# Patient Record
Sex: MALE | Race: WHITE
[De-identification: names, ages, dates, MRNs, and addresses within clinical notes are randomized per-mention and may not be internally consistent; named-entity substitution may affect disease eponyms.]

---

## 2017-01-24 NOTE — DIAGNOSTIC IMAGING REPORT
CT SCAN OF THE ABDOMEN AND PELVIS WITHOUT IV CONTRAST



CLINICAL HISTORY:   Left flank pain.



COMPARISON STUDY:  Abdominal CT dated 9/20/2011.



TECHNIQUE: CT scan of the abdomen and pelvis is performed from the lung bases to

the proximal femora. Images are reviewed in the axial, sagittal, and coronal

planes. IV contrast was not administered for this examination. Automated dose

control exposure was utilized.



CT DOSE: 1090.16 mGycm



FINDINGS:



Lung bases: The heart is enlarged and without pericardial effusion. The coronary

arteries are densely calcified. There is a 4 mm pleural-based nodule at the left

lung base. This is unchanged from 2011 and of doubtful significance. No airspace

consolidation or pleural effusion is identified. There is dependent atelectasis.

There is a small hiatal hernia.



Liver: The unenhanced liver is normal in size, contour, and attenuation. There

is no intrahepatic biliary ductal dilatation.



Gallbladder: Unremarkable.



Spleen: Normal in size and attenuation.



Pancreas: Unremarkable.



Adrenal glands: Unremarkable.



Kidneys: The unenhanced kidneys are normal in size and without hydronephrosis.

There are at least 3 nonobstructing left renal calculi and at least 5

nonobstructing right renal calculi measuring up to 3 mm. There is no evidence of

contour deforming renal mass lesion.



Abdominal vasculature: The abdominal aorta is normal in course and caliber

noting mild atherosclerotic calcification.



Bowel: The small bowel and colon are normal in course and caliber. There is mild

sigmoid diverticulosis without CT evidence of acute diverticulitis. The appendix

is  not clearly identified.



Peritoneum: There is no intraperitoneal free air or abdominal ascites.



Lymphadenopathy: None.



Pelvic viscera: The bladder, prostate, and seminal vesicles are normal as

visualized.. Surgical clips are noted along the spermatic cord.



Skeletal structures: No lytic or blastic lesions are seen. There is

mild/moderate lumbar sacral spondylosis.





IMPRESSION:



1. There are no acute infectious or inflammatory findings in the abdomen or

pelvis.



2. Bilateral nonobstructing renal calculi are identified. There is no

obstructing renal calculus or hydronephrosis seen.



3. Cardiomegaly.



4. Colonic diverticulosis without CT evidence of acute diverticulitis.



5. Additional findings as above.







Electronically signed by:  Michael Purvis M.D.

1/24/2017 4:08 PM



Dictated Date/Time:  1/24/2017 4:00 PM

## 2017-01-24 NOTE — EMERGENCY ROOM VISIT NOTE
History


First contact with patient:  15:05


Chief Complaint:  ABDOMINAL PAIN


Stated Complaint:  KIDNEY STONE


Nursing Triage Summary:  


pt reports abdominal pain LLflank X 3 days , denies NV , or urinary sx





History of Present Illness


The patient is a 56 year old male who presents to the Emergency Room with 

complaints of left flank pain intermittently over the past 3 days.  The patient 

denies any urinary symptoms of frequency, urgency, dysuria or hematuria.  The 

patient admits to slight nausea but denies any vomiting.  The patient does 

admit to having loose bowel movements 3-4 day for the past several days.  The 

patient does admit that he has IBS it is under a lot of stress lately.  This is 

not that abnormal for the patient.  He has had colonoscopies in the past 

without any evidence of diverticulosis.  The patient denies any fever.





Review of Systems


10 system review was performed and was negative unless stated otherwise history 

of present illness.





Past Medical/Surgical History


Medical Problems:


(1) Esophageal dysmotility


(2) Gastroesophageal reflux disease


(3) Kidney stone








Social History


Smoking Status:  Former Smoker


Alcohol Use:  occasionally


Marital Status:  


Occupation Status:  employed





Current/Historical Medications


Scheduled


Aspirin (Aspirin Ec), 81 MG PO QPM


Atorvastatin (Atorvastatin Calcium), 20 MG PO HS


Cholecalciferol (Vitamin D), 2,000 INTER.UNIT PO QAM


Fiber Laxative (Fiber Laxative), 1 TAB PO DAILY


Pantoprazole (Pantoprazole Sodium), 40 MG PO BID


Sertraline HCl (Sertraline HCl), 50 MG PO DAILY





Scheduled PRN


Lorazepam (Lorazepam), 0.5 MG PO DAILY PRN for Anxiety





Allergies


Coded Allergies:  


     Sulfa Antibiotics (Verified  Allergy, Severe, SEVERE HIVES/ITCHING, 1/24/17

)





Physical Exam


Vital Signs











  Date Time  Temp Pulse Resp B/P Pulse Ox O2 Delivery O2 Flow Rate FiO2


 


1/24/17 15:00 36.7 67 18 178/106 98 Room Air  











Physical Exam


GENERAL: 56-year-old white male appears in no acute distress.


MENTAL Status: Alert and oriented 3.


EYES: No icterus noted


MOUTH: Mucosa is moist


NECK: Supple, no lymphadenopathy noted.  No carotid bruits noted.


LUNGS: Clear auscultation without wheezes rales or rhonchi.


CARDIAC: Regular rate and rhythm without murmur.  Pulses is full and equal 

throughout.


BACK: No CVA tenderness noted.


ABDOMEN: Positive bowel sounds all 4 quadrants.  Soft, nontender to palpation 

without organomegaly or masses.


EXTREMITIES: No cyanosis or edema noted.





Medical Decision & Procedures


ER Provider


Diagnostic Interpretation:


CT SCAN OF THE ABDOMEN AND PELVIS WITHOUT IV CONTRAST





CLINICAL HISTORY:   Left flank pain.





COMPARISON STUDY:  Abdominal CT dated 9/20/2011.





TECHNIQUE: CT scan of the abdomen and pelvis is performed from the lung bases to


the proximal femora. Images are reviewed in the axial, sagittal, and coronal


planes. IV contrast was not administered for this examination. Automated dose


control exposure was utilized.





CT DOSE: 1090.16 mGycm





FINDINGS:





Lung bases: The heart is enlarged and without pericardial effusion. The coronary


arteries are densely calcified. There is a 4 mm pleural-based nodule at the left


lung base. This is unchanged from 2011 and of doubtful significance. No airspace


consolidation or pleural effusion is identified. There is dependent atelectasis.


There is a small hiatal hernia.





Liver: The unenhanced liver is normal in size, contour, and attenuation. There


is no intrahepatic biliary ductal dilatation.





Gallbladder: Unremarkable.





Spleen: Normal in size and attenuation.





Pancreas: Unremarkable.





Adrenal glands: Unremarkable.





Kidneys: The unenhanced kidneys are normal in size and without hydronephrosis.


There are at least 3 nonobstructing left renal calculi and at least 5


nonobstructing right renal calculi measuring up to 3 mm. There is no evidence of


contour deforming renal mass lesion.





Abdominal vasculature: The abdominal aorta is normal in course and caliber


noting mild atherosclerotic calcification.





Bowel: The small bowel and colon are normal in course and caliber. There is mild


sigmoid diverticulosis without CT evidence of acute diverticulitis. The appendix


is  not clearly identified.





Peritoneum: There is no intraperitoneal free air or abdominal ascites.





Lymphadenopathy: None.





Pelvic viscera: The bladder, prostate, and seminal vesicles are normal as


visualized.. Surgical clips are noted along the spermatic cord.





Skeletal structures: No lytic or blastic lesions are seen. There is


mild/moderate lumbar sacral spondylosis.








IMPRESSION:





1. There are no acute infectious or inflammatory findings in the abdomen or


pelvis.





2. Bilateral nonobstructing renal calculi are identified. There is no


obstructing renal calculus or hydronephrosis seen.





3. Cardiomegaly.





4. Colonic diverticulosis without CT evidence of acute diverticulitis.





5. Additional findings as above.











Electronically signed by:  Michael Purvis M.D.


1/24/2017 4:08 PM





Laboratory Results


1/24/17 15:00








Red Blood Count 4.58, Mean Corpuscular Volume 91.9, Mean Corpuscular Hemoglobin 

32.1, Mean Corpuscular Hemoglobin Concent 34.9, Mean Platelet Volume 10.1, 

Neutrophils (%) (Auto) 58.2, Lymphocytes (%) (Auto) 29.2, Monocytes (%) (Auto) 

10.6, Eosinophils (%) (Auto) 1.6, Basophils (%) (Auto) 0.2, Neutrophils # (Auto

) 3.37, Lymphocytes # (Auto) 1.69, Monocytes # (Auto) 0.61, Eosinophils # (Auto

) 0.09, Basophils # (Auto) 0.01





1/24/17 15:00

















Test


  1/24/17


15:00 1/24/17


15:10


 


White Blood Count


  5.78 K/uL


(4.8-10.8) 


 


 


Red Blood Count


  4.58 M/uL


(4.7-6.1) 


 


 


Hemoglobin


  14.7 g/dL


(14.0-18.0) 


 


 


Hematocrit 42.1 % (42-52)  


 


Mean Corpuscular Volume


  91.9 fL


() 


 


 


Mean Corpuscular Hemoglobin


  32.1 pg


(25-34) 


 


 


Mean Corpuscular Hemoglobin


Concent 34.9 g/dl


(32-36) 


 


 


Platelet Count


  222 K/uL


(130-400) 


 


 


Mean Platelet Volume


  10.1 fL


(7.4-10.4) 


 


 


Neutrophils (%) (Auto) 58.2 %  


 


Lymphocytes (%) (Auto) 29.2 %  


 


Monocytes (%) (Auto) 10.6 %  


 


Eosinophils (%) (Auto) 1.6 %  


 


Basophils (%) (Auto) 0.2 %  


 


Neutrophils # (Auto)


  3.37 K/uL


(1.4-6.5) 


 


 


Lymphocytes # (Auto)


  1.69 K/uL


(1.2-3.4) 


 


 


Monocytes # (Auto)


  0.61 K/uL


(0.11-0.59) 


 


 


Eosinophils # (Auto)


  0.09 K/uL


(0-0.5) 


 


 


Basophils # (Auto)


  0.01 K/uL


(0-0.2) 


 


 


RDW Standard Deviation


  42.0 fL


(36.4-46.3) 


 


 


RDW Coefficient of Variation


  12.5 %


(11.5-14.5) 


 


 


Immature Granulocyte % (Auto) 0.2 %  


 


Immature Granulocyte # (Auto)


  0.01 K/uL


(0.00-0.02) 


 


 


Anion Gap


  8.0 mmol/L


(3-11) 


 


 


Est Creatinine Clear Calc


Drug Dose 75.8 ml/min 


  


 


 


Estimated GFR (


American) 76.8 


  


 


 


Estimated GFR (Non-


American 66.3 


  


 


 


BUN/Creatinine Ratio 23.6 (10-20)  


 


Calcium Level


  9.1 mg/dl


(8.5-10.1) 


 


 


Total Bilirubin


  0.3 mg/dl


(0.2-1) 


 


 


Direct Bilirubin


  < 0.1 mg/dl


(0-0.2) 


 


 


Aspartate Amino Transf


(AST/SGOT) 22 U/L (15-37) 


  


 


 


Alanine Aminotransferase


(ALT/SGPT) 30 U/L (12-78) 


  


 


 


Alkaline Phosphatase


  69 U/L


() 


 


 


Total Protein


  7.7 gm/dl


(6.4-8.2) 


 


 


Albumin


  4.2 gm/dl


(3.4-5.0) 


 


 


Lipase


  284 U/L


() 


 


 


Urine Color  YELLOW 


 


Urine Appearance  CLEAR (CLEAR) 


 


Urine pH  7.5 (4.5-7.5) 


 


Urine Specific Gravity


  


  1.017


(1.000-1.030)


 


Urine Protein  NEG (NEG) 


 


Urine Glucose (UA)  NEG (NEG) 


 


Urine Ketones  NEG (NEG) 


 


Urine Occult Blood  NEG (NEG) 


 


Urine Nitrite  NEG (NEG) 


 


Urine Bilirubin  NEG (NEG) 


 


Urine Urobilinogen  NEG (NEG) 


 


Urine Leukocyte Esterase  TRACE (NEG) 


 


Urine WBC (Auto)  1-5 /hpf (0-5) 


 


Urine RBC (Auto)  0-4 /hpf (0-4) 


 


Urine Hyaline Casts (Auto)  0 /lpf (0-5) 


 


Urine Epithelial Cells (Auto)  0-5 /lpf (0-5) 


 


Urine Bacteria (Auto)  NEG (NEG) 











Medications Administered











 Medications


  (Trade)  Dose


 Ordered  Sig/Romario


 Route  Start Time


 Stop Time Status Last Admin


Dose Admin


 


 Sodium Chloride


  (Nss 1000ml)  1,000 ml @ 


 999 mls/hr  Q1H1M STAT


 IV  1/24/17 15:25


 1/24/17 16:25 DC 1/24/17 15:43


999 MLS/HR


 


 Ketorolac


 Tromethamine


  (Toradol Inj)  30 mg  NOW  STAT


 IV  1/24/17 15:25


 1/24/17 15:27 DC 1/24/17 15:43


30 MG


 


 Morphine Sulfate


  (MoRPHine


 SULFATE INJ)  6 mg  NOW  STAT


 IV  1/24/17 15:25


 1/24/17 15:27 DC 1/24/17 15:43


6 MG


 


 Ondansetron HCl


  (Zofran Inj)  4 mg  NOW  STAT


 IV  1/24/17 15:25


 1/24/17 15:27 DC 1/24/17 15:42


4 MG


 


 Tamsulosin HCl


  (Flomax Cap)  0.4 mg  NOW  ONCE


 PO  1/24/17 15:30


 1/24/17 15:31 DC 1/24/17 15:43


0.4 MG











ED Course


The patient was evaluated.  IV access was obtained.  The patient was given 1 L 

normal saline wide-open.  CBC and differential, renal profile, LFTs and lipase 

levels was ordered.  Urinalysis was ordered.  The patient was given Toradol 30 

mg IV, morphine 6 mg IV, Zofran 4 mg IV and Flomax 0.4 mg by mouth.  The patient

's labs are reviewed.  The patient's BUN was slightly elevated otherwise labs 

are unremarkable.  Urinalysis was negative.  CT stone study was ordered and 

interpreted by the radiologist as above without any evidence of ureteral 

calculi or hydronephrosis.  There were multiple renal calculi bilaterally.  

There is diverticulosis without any evidence of diverticulitis.  The patient 

was informed of the findings.  The patient was discharged home in stable 

condition.





Medical Decision


Differential diagnosis include UTI, pyelonephritis, ureteral calculi, 

diverticulitis, colitis, IBS





Impression





 Primary Impression:  


 IBS (irritable bowel syndrome)


 Additional Impression:  


 Dehydration





Departure Information


Dispostion


Home / Self-Care





Condition


GOOD





Prescriptions





Dicyclomine Hcl (BENTYL) 20 Mg Tab


20 MG PO Q8 for Diarrhea, #20 TAB


   Prov: April Ortega PA-C         1/24/17





Referrals


LEILA De La Rosa MD (PCP)





Forms


Call Back Authorization, HOME CARE DOCUMENTATION FORM,                         

                                       IMPORTANT VISIT INFORMATION





Patient Instructions


Dehydration, My [facility]





Additional Instructions





Push fluids.  Take Bentyl as needed for cramping and diarrhea.  If symptoms 

persist or worsen, follow-up with your family doctor.





Problem Qualifiers








 Primary Impression:  


 IBS (irritable bowel syndrome)


 Irritable bowel syndrome type:  with diarrhea  Qualified Codes:  K58.0 - 

Irritable bowel syndrome with diarrhea

## 2017-03-28 NOTE — DIAGNOSTIC IMAGING REPORT
UPPER GI SERIES AND SMALL BOWEL FOLLOW-THROUGH



CLINICAL HISTORY: Abdominal pain. Esophagitis. Hiatal hernia.   



COMPARISON STUDY:  Upper GI series and small bowel follow-through October 13, 2011.



FLUOROSCOPY TIME: 3.7 minutes.



FINDINGS: 35 fluoroscopic images were obtained. There was mild to moderate

esophageal dysmotility. No esophageal stricture was identified. There was mild

mucosal irregularity of the mid to distal esophagus. This may reflect

esophagitis. No hiatal hernia was identified. No reflux was elicited. Mild

gastric fold thickening was noted. Duodenum was normal. Transit time to the

cecum was normal at 50 minutes. The terminal ileum was not well visualized on

this exam. Jejunal and ileal fold patterns were normal.



IMPRESSION:  



1. Gastric fold thickening. While nonspecific, this may reflect gastritis.



2. Mild mucosal irregularity the mid to distal esophagus which may reflect

esophagitis. No esophageal stricture.



3. No evidence of small bowel obstruction. Normal small bowel fold pattern.



4. Mild to moderate esophageal dysmotility.







Electronically signed by:  Michi Abernathy M.D.

3/28/2017 8:48 AM



Dictated Date/Time:  3/28/2017 8:45 AM

## 2017-08-20 NOTE — DIAGNOSTIC IMAGING REPORT
CT SCAN OF THE ABDOMEN AND PELVIS WITHOUT IV CONTRAST



CLINICAL HISTORY:   Bloating. Generalized abdominal pain.



COMPARISON STUDY:  Abdominal CT dated 1/24/2017 and 9/20/2011.



TECHNIQUE: CT scan of the abdomen and pelvis is performed from the lung bases to

the proximal femora. Images are reviewed in the axial, sagittal, and coronal

planes. IV contrast was not administered for this examination as per the

referring clinician. Note that the examination was performed in suboptimal

fashion without oral and IV contrast. Automated dose control exposure was

utilized.



CT DOSE: 475.87 mGy.cm



FINDINGS:



Lung bases: The heart is top normal in size and without pericardial effusion.

There are coronary artery calcifications. There is a 4 mm pleural-based nodule

at the left lung base on image #14. This is unchanged from 2011 and of doubtful

significance. No airspace consolidation or pleural effusion is identified. There

is dependent atelectasis. There is a small hiatal hernia.



Liver: The unenhanced liver is normal in size, contour, and attenuation. There

is no intrahepatic biliary ductal dilatation.



Gallbladder: Unremarkable.



Spleen: Normal in size and attenuation.



Pancreas: Unremarkable.



Adrenal glands: Unremarkable.



Kidneys: The unenhanced kidneys are normal in size and without hydronephrosis.

There are at least 2 nonobstructing left renal calculi and at least 4

nonobstructing right renal calculi measuring up to 3 mm. There is no evidence of

contour deforming renal mass lesion.



Abdominal vasculature: The abdominal aorta is normal in course and caliber

noting mild to moderate atherosclerotic calcification.



Bowel: The small bowel and colon are normal in course and caliber. There is mild

sigmoid diverticulosis without CT evidence of acute diverticulitis. The appendix

is  not identified and reported surgically absent.



Peritoneum: There is no intraperitoneal free air or abdominal ascites.



Lymphadenopathy: None.



Pelvic viscera: The bladder, prostate, and seminal vesicles are normal as

visualized.. Surgical clips are noted along the spermatic cord.



Skeletal structures: No lytic or blastic lesions are seen. There is mild to

moderate lumbosacral spondylosis.





IMPRESSION:



1. Suboptimal examination without oral and IV contrast.



2. There are no acute infectious or inflammatory findings in the abdomen or

pelvis.



3. There are small bilateral nonobstructing renal calculi.



4. Colonic diverticulosis without CT evidence of acute diverticulitis.



5. Additional findings as above.







Electronically signed by:  Michael Purvis M.D.

8/20/2017 12:52 PM



Dictated Date/Time:  8/20/2017 12:43 PM

## 2017-08-20 NOTE — EMERGENCY ROOM VISIT NOTE
History


Report prepared by Alfredo:  Marla Haddad


Under the Supervision of:  JOSE PickeringO.


First contact with patient:  12:04


Chief Complaint:  ABDOMINAL PAIN


Stated Complaint:  BLOATING PAIN IN BELLY, NO BM'S


Nursing Triage Summary:  


pt to the ED with c/o abd pain that is all over with chills and last BM was 


yesterday





pt is concerned bc he feels like he did before with a previous bowel obstruction





History of Present Illness


The patient is a 56 year old male who presents to the Emergency Room with 

complaints of constant abdominal pain beginning yesterday. The patient states 

that he has a history of bowel obstruction and has been hospitalized 2 times 

before. He reports that he has been having similar symptoms to his previous 

obstruction and has had body aches and chills. He denies any rectal bleeding, 

vomiting, and constipation. The patient states that he had a restless night 

last night and has been having dry heaves that are not unusual for him due to 

his history of esophageal varices. He complains of a productive cough and notes 

that he had 2 bowel movements yesterday and none today. He reports a history of 

appendicitis and hernia repair.





   Source of History:  patient


   Onset:  last night


   Position:  abdomen


   Timing:  constant


   Associated Symptoms:  + chills, No vomiting


Note:


Pt complains of aches. He denies any constipation and rectal pain.





Review of Systems


See HPI for pertinent positives & negatives. A total of 10 systems reviewed and 

were otherwise negative.





Past Medical & Surgical


Medical Problems:


(1) Esophageal dysmotility


(2) Gastroesophageal reflux disease


(3) Kidney stone








Family History


No pertinent family history stated.





Social History


Smoking Status:  Former Smoker


Alcohol Use:  occasionally


Marital Status:  


Occupation Status:  employed





Current/Historical Medications


Scheduled


Aspirin (Aspirin Ec), 81 MG PO QPM


Atorvastatin (Atorvastatin Calcium), 20 MG PO HS


Cholecalciferol (Vitamin D), 2,000 INTER.UNIT PO QAM


Fiber Laxative (Fiber Laxative), 1 TAB PO DAILY


Ondasetron Odt (Zofran Odt), 4 MG SL Q6H


Pantoprazole (Pantoprazole Sodium), 40 MG PO BID


Polyethylene Glycol 3350 (Miralax), 17 GM PO DAILY


Sertraline HCl (Sertraline HCl), 50 MG PO DAILY





Scheduled PRN


Lorazepam (Lorazepam), 0.5 MG PO DAILY PRN for Anxiety


Oxycodone Immediate Rel Tab (Roxicodone Ir), 1-2 TAB PO Q4H PRN for Severe Pain





Allergies


Coded Allergies:  


     Sulfa Antibiotics (Verified  Allergy, Severe, SEVERE HIVES/ITCHING, 1/24/17

)





Physical Exam


Vital Signs











  Date Time  Temp Pulse Resp B/P (MAP) Pulse Ox O2 Delivery O2 Flow Rate FiO2


 


8/20/17 13:50  84 18 153/98 97   


 


8/20/17 12:29  84      


 


8/20/17 11:46 36.9 99 18 171/102 98   











Physical Exam


GENERAL:  Patient is awake, alert, and in no acute distress. Patient is resting 

comfortably and showing no signs of anxiety


EYES: The conjunctivae are clear.  The pupils are round and reactive. 


EARS, NOSE, MOUTH AND THROAT: The nose is without any evidence of any 

deformity. Mucous membranes are moist tongue is midline 


NECK: The neck is nontender and supple.


RESPIRATORY: Lung sounds diminished throughout, scattered rhonchi, no tachypnea 

or conversational dyspnea.


CARDIOVASCULAR:  Regular rate and rhythm noted there no murmurs rubs or gallops 

normal S1 normal S2 


GASTROINTESTINAL: The abdomen is moderately distended but soft. Bowel sounds 

are present in all quadrants. Abdomen is nontender


PELVIS:  The Pelvis is stable.  No tenderness to palpation is noted.  


BACK:  No midline tenderness or or step-off noted range of motion in flexion 

extension as well as rotation no signs of muscle spasm noted


MUSCULOSKELETAL/EXTREMITIES: There is no evidence of gross deformity full range 

of motion is noted in the hips and shoulders


SKIN: There is no obvious evidence of any rash. There are no petechiae, pallor 

or cyanosis noted. 


NEUROLOGIC:  Patient is awake alert and oriented x3





Medical Decision & Procedures


ER Provider


Diagnostic Interpretation:


Radiology results as stated below per my review and radiologist interpretation:





SINGLE VIEW CHEST





FINDINGS: An AP, portable, upright chest radiograph is compared to study dated


6/9/2015 and correlated with chest CT dated 1/5/2016. The examination is


degraded by portable technique and patient rotation.  The cardiomediastinal


silhouette is unremarkable. The lungs and pleural spaces are clear. No


pneumothorax is seen. The bony thorax is grossly intact.





IMPRESSION: No active disease in the chest.





Electronically signed by:  Michael Purvis M.D.


8/20/2017 12:57 PM





Dictated Date/Time:  8/20/2017 12:57 PM





CT SCAN OF THE ABDOMEN AND PELVIS WITHOUT IV CONTRAST





FINDINGS:





Lung bases: The heart is top normal in size and without pericardial effusion.


There are coronary artery calcifications. There is a 4 mm pleural-based nodule


at the left lung base on image #14. This is unchanged from 2011 and of doubtful


significance. No airspace consolidation or pleural effusion is identified. There


is dependent atelectasis. There is a small hiatal hernia.





Liver: The unenhanced liver is normal in size, contour, and attenuation. There


is no intrahepatic biliary ductal dilatation.





Gallbladder: Unremarkable.





Spleen: Normal in size and attenuation.





Pancreas: Unremarkable.





Adrenal glands: Unremarkable.





Kidneys: The unenhanced kidneys are normal in size and without hydronephrosis.


There are at least 2 nonobstructing left renal calculi and at least 4


nonobstructing right renal calculi measuring up to 3 mm. There is no evidence of


contour deforming renal mass lesion.





Abdominal vasculature: The abdominal aorta is normal in course and caliber


noting mild to moderate atherosclerotic calcification.





Bowel: The small bowel and colon are normal in course and caliber. There is mild


sigmoid diverticulosis without CT evidence of acute diverticulitis. The appendix


is  not identified and reported surgically absent.





Peritoneum: There is no intraperitoneal free air or abdominal ascites.





Lymphadenopathy: None.





Pelvic viscera: The bladder, prostate, and seminal vesicles are normal as


visualized.. Surgical clips are noted along the spermatic cord.





Skeletal structures: No lytic or blastic lesions are seen. There is mild to


moderate lumbosacral spondylosis.








IMPRESSION:





1. Suboptimal examination without oral and IV contrast.





2. There are no acute infectious or inflammatory findings in the abdomen or


pelvis.





3. There are small bilateral nonobstructing renal calculi.





4. Colonic diverticulosis without CT evidence of acute diverticulitis.





5. Additional findings as above.





Electronically signed by:  Michael Purvis M.D.


8/20/2017 12:52 PM





Dictated Date/Time:  8/20/2017 12:43 PM





Laboratory Results


8/20/17 12:21








Red Blood Count 4.38, Mean Corpuscular Volume 93.8, Mean Corpuscular Hemoglobin 

33.1, Mean Corpuscular Hemoglobin Concent 35.3, Mean Platelet Volume 10.1, 

Neutrophils (%) (Auto) 78.1, Lymphocytes (%) (Auto) 10.2, Monocytes (%) (Auto) 

11.0, Eosinophils (%) (Auto) 0.2, Basophils (%) (Auto) 0.3, Neutrophils # (Auto

) 4.60, Lymphocytes # (Auto) 0.60, Monocytes # (Auto) 0.65, Eosinophils # (Auto

) 0.01, Basophils # (Auto) 0.02





8/20/17 12:21

















Test


  8/20/17


12:21 8/20/17


13:05


 


White Blood Count


  5.89 K/uL


(4.8-10.8) 


 


 


Red Blood Count


  4.38 M/uL


(4.7-6.1) 


 


 


Hemoglobin


  14.5 g/dL


(14.0-18.0) 


 


 


Hematocrit 41.1 % (42-52)  


 


Mean Corpuscular Volume


  93.8 fL


() 


 


 


Mean Corpuscular Hemoglobin


  33.1 pg


(25-34) 


 


 


Mean Corpuscular Hemoglobin


Concent 35.3 g/dl


(32-36) 


 


 


Platelet Count


  174 K/uL


(130-400) 


 


 


Mean Platelet Volume


  10.1 fL


(7.4-10.4) 


 


 


Neutrophils (%) (Auto) 78.1 %  


 


Lymphocytes (%) (Auto) 10.2 %  


 


Monocytes (%) (Auto) 11.0 %  


 


Eosinophils (%) (Auto) 0.2 %  


 


Basophils (%) (Auto) 0.3 %  


 


Neutrophils # (Auto)


  4.60 K/uL


(1.4-6.5) 


 


 


Lymphocytes # (Auto)


  0.60 K/uL


(1.2-3.4) 


 


 


Monocytes # (Auto)


  0.65 K/uL


(0.11-0.59) 


 


 


Eosinophils # (Auto)


  0.01 K/uL


(0-0.5) 


 


 


Basophils # (Auto)


  0.02 K/uL


(0-0.2) 


 


 


RDW Standard Deviation


  41.1 fL


(36.4-46.3) 


 


 


RDW Coefficient of Variation


  12.0 %


(11.5-14.5) 


 


 


Immature Granulocyte % (Auto) 0.2 %  


 


Immature Granulocyte # (Auto)


  0.01 K/uL


(0.00-0.02) 


 


 


Anion Gap


  7.0 mmol/L


(3-11) 


 


 


Est Creatinine Clear Calc


Drug Dose 75.8 ml/min 


  


 


 


Estimated GFR (


American) 76.8 


  


 


 


Estimated GFR (Non-


American 66.3 


  


 


 


BUN/Creatinine Ratio 13.8 (10-20)  


 


Calcium Level


  9.2 mg/dl


(8.5-10.1) 


 


 


Total Bilirubin


  1.3 mg/dl


(0.2-1) 


 


 


Direct Bilirubin


  0.3 mg/dl


(0-0.2) 


 


 


Aspartate Amino Transf


(AST/SGOT) 25 U/L (15-37) 


  


 


 


Alanine Aminotransferase


(ALT/SGPT) 34 U/L (12-78) 


  


 


 


Alkaline Phosphatase


  68 U/L


() 


 


 


Total Protein


  7.5 gm/dl


(6.4-8.2) 


 


 


Albumin


  4.1 gm/dl


(3.4-5.0) 


 


 


Lipase


  178 U/L


() 


 


 


Urine Color  YELLOW 


 


Urine Appearance  CLEAR (CLEAR) 


 


Urine pH  6.0 (4.5-7.5) 


 


Urine Specific Gravity


  


  1.012


(1.000-1.030)


 


Urine Protein  NEG (NEG) 


 


Urine Glucose (UA)  NEG (NEG) 


 


Urine Ketones  NEG (NEG) 


 


Urine Occult Blood  NEG (NEG) 


 


Urine Nitrite  NEG (NEG) 


 


Urine Bilirubin  NEG (NEG) 


 


Urine Urobilinogen  NEG (NEG) 


 


Urine Leukocyte Esterase  NEG (NEG) 





Laboratory results per my review.





Medications Administered











 Medications


  (Trade)  Dose


 Ordered  Sig/Romario


 Route  Start Time


 Stop Time Status Last Admin


Dose Admin


 


 Sodium Chloride  1,000 ml @ 


 999 mls/hr  Q1H1M STAT


 IV  8/20/17 12:08


 8/20/17 13:08 DC 8/20/17 12:25


999 MLS/HR


 


 Ondansetron HCl


  (Zofran Inj)  4 mg  NOW  STAT


 IV  8/20/17 12:08


 8/20/17 12:09 DC 8/20/17 12:29


4 MG


 


 Morphine Sulfate


  (MoRPHine


 SULFATE INJ)  4 mg  Q15M  PRN


 IV  8/20/17 12:15


 8/20/17 14:35 DC 8/20/17 12:30


4 MG











ED Course


1204: The patient was evaluated in room C7. A complete history and physical 

examination were performed. 





1208: Zofran Inj 4mg IV, NSS 1,000 ml @ 999 mls/hr IV. 





1215: Morphine Sulfate 4mg PRN IV pain.





1329: I reevaluated and updated the patient. 





1335: Upon reevaluation, the patient is doing well. I discussed the results and 

treatment plan with the patient. He verbalized agreement of the treatment plan. 

The patient was discharged home.





Medical Decision


Differential diagnosis:


Etiologies such as appendicitis, diverticulitis, PUD, biliary pathology, UTI, 

pancreatitis, obstruction, mesenteric ischemia, aortic pathology, infections, 

inflammatory bowel disease, renal colic, as well as others were entertained. 





Nursing notes reviewed.





The patient is a 56-year-old male who presented to the emergency department for 

an evaluation of abdominal pain. The patient states he's noticed abdominal 

distention and has a history of small bowel obstruction in the past. The patient

's abdominal exam was not consistent with an acute surgical abdomen. On CT he 

does not have signs of obstruction but does have some degree of constipation. 

He was treated with IV fluids IV pain medicine and IV antiemetics. On 

subsequent reevaluation he was feeling much better. I discussed the patient's 

laboratory and radiographic studies with him. He was encouraged to rest and 

follow-up with his family doctor this week for reevaluation. Otherwise she was 

encouraged to continue all medications as prescribed and return to the 

emergency department immediately if symptoms change worsen or the need arises.





[place] Drug Monitoring Program


Search Results:  patient reviewed within database, no issues identified





Medication Reconcilliation


Current Medication List:  was personally reviewed by me





Blood Pressure Screening


Patient's blood pressure:  Elevated blood pressure


Blood pressure disposition:  Elevated BP felt to be situational





Impression





 Primary Impression:  


 Abdominal pain





Scribe Attestation


The scribe's documentation has been prepared under my direction and personally 

reviewed by me in its entirety. I confirm that the note above accurately 

reflects all work, treatment, procedures, and medical decision making performed 

by me.





Departure Information


Dispostion


Being Evaluated By Hospitalist





Prescriptions





Polyethylene Glycol 3350 (MIRALAX) 1 Pow Pow


17 GM PO DAILY, #527 GM


   Prov: Chaparro Alberts, DO         8/20/17 


Ondasetron Odt (ZOFRAN ODT) 4 Mg Tab


4 MG SL Q6H for Nausea, #15 TAB


   Prov: Chaparro Alberts, DO         8/20/17 


Oxycodone Immediate Rel Tab (ROXICODONE IR) 5 Mg Tab


1-2 TAB PO Q4H Y for Severe Pain, #24 TAB


   Prov: Chaparro Alberts, DO         8/20/17





Referrals


No Doctor, Assigned (PCP)





Forms


Call Back Authorization, HOME CARE DOCUMENTATION FORM,                         

                                       IMPORTANT VISIT INFORMATION





Patient Instructions


Abdominal Pain, My [facility]





Additional Instructions





Call your family  to schedule a follow-up appointment. Continue all 

medications as prescribed. Drink plenty clear liquids. Return to the emergency 

department immediately if symptoms change worsen or the need arises.





Problem Qualifiers








 Primary Impression:  


 Abdominal pain


 Abdominal location:  generalized  Qualified Codes:  R10.84 - Generalized 

abdominal pain

## 2017-08-20 NOTE — DIAGNOSTIC IMAGING REPORT
SINGLE VIEW CHEST



CLINICAL HISTORY:  Generalized abdominal pain.



FINDINGS: An AP, portable, upright chest radiograph is compared to study dated

6/9/2015 and correlated with chest CT dated 1/5/2016. The examination is

degraded by portable technique and patient rotation.  The cardiomediastinal

silhouette is unremarkable. The lungs and pleural spaces are clear. No

pneumothorax is seen. The bony thorax is grossly intact.



IMPRESSION: No active disease in the chest.







Electronically signed by:  Michael Purvis M.D.

8/20/2017 12:57 PM



Dictated Date/Time:  8/20/2017 12:57 PM

## 2017-08-28 NOTE — DIAGNOSTIC IMAGING REPORT
RIGHT RIBS UNILATERAL WITH PA CHEST



HISTORY:  56 years-old Male J20.9 Acute [id number]

Right 



COMPARISON: Chest radiograph 8/20/2017



TECHNIQUE: Frontal view of the chest with 4 views of the right ribs



FINDINGS: 

Cardiomediastinal and hilar silhouettes are within normal limits. There is no

pneumothorax, pleural effusion or focal airspace consolidation. The bones appear

to be grossly intact. Degenerative changes involve the bilateral shoulders. No

acute displaced rib fracture identified.



IMPRESSION: 

1. No acute cardiopulmonary process.

2. No acute displaced rib fracture or pneumothorax. 







The above report was generated using voice recognition software. It may contain

grammatical, syntax or spelling errors.







Electronically signed by:  Sergo Dahl M.D.

8/28/2017 4:27 PM



Dictated Date/Time:  8/28/2017 4:24 PM

## 2017-08-28 NOTE — DIAGNOSTIC IMAGING REPORT
RIGHT SCAPULA



HISTORY:  56 years-old Male M25.511 Right shoulder [id number]

Right 



COMPARISON: Chest and rib radiographs of same day



TECHNIQUE: 2 views of the right scapula.



FINDINGS: 

The scapula, imaged right humerus and right clavicle are intact without acute

fracture or dislocation. Moderate degenerative changes are present within the

right AC joint with mild/moderate glenohumeral osteoarthritis. Imaged lung

fields are clear.



IMPRESSION:

1. No acute bony abnormality of the right scapula.

2. Degenerative changes about the right shoulder as above.







The above report was generated using voice recognition software. It may contain

grammatical, syntax or spelling errors.







Electronically signed by:  Sergo Dahl M.D.

8/28/2017 4:30 PM



Dictated Date/Time:  8/28/2017 4:27 PM

## 2017-09-05 NOTE — DIAGNOSTIC IMAGING REPORT
UPPER EXT JOINT WITHOUT



CLINICAL HISTORY: 56 years-old Male with M25.511 Right shoulder painRight

shoulder/scapular pain, tendern.  Acute right shoulder pain without reported

trauma. Initial exam.



COMPARISON: Right scapula radiographs 8/28/2017.



TECHNIQUE: Multiplanar, multi sequence MRI of the right shoulder was performed

without intravenous contrast. 



FINDINGS: 



ROTATOR CUFF:  No high-grade partial or full-thickness tear of the rotator cuff

identified. Low-grade partial-thickness articular sided tear involves the mid

insertional fibers of the supraspinatus, 6 x 8 mm in AP and transverse

dimension. There is moderate supraspinatus tendinosis. 



There is severe tendinosis of the subscapularis tendon with low-grade partial

thickness tearing noted involving the superior insertional fibers. Muscle signal

and morphology is within normal limits without atrophy.



BICEPS TENDON:  There is moderate tendinosis of the intra-articular long head

biceps tendon as seen on image 11 of the sagittal T2 series. There is high-grade

split tear of the long head biceps tendon which originates as it enters the

intertubercular groove and extends distally approximately 3.2 cm as seen on

image 6 of the sagittal T2 series. Mild surrounding soft tissue edema is noted.



LABRUM:  There is fraying and likely chronic appearing tearing of the anterior

superior and posterior inferior labrum without significant soft tissue edema,

displaced fragment or para-labral cyst.



GLENOHUMERAL JOINT:  There is mild glenohumeral osteoarthritis without

intra-articular loose body identified. Small joint effusion.



ACROMIOCLAVICULAR JOINT:  Moderate to severe osteoarthritis with capsular

hypertrophy and marginal spurring. Trace subacromial/subdeltoid bursitis.



OUTLET SPACES:  The suprascapular notch and quadrilateral space are without

obstructing or space occupying lesions.



BONE MARROW:  Subcortical cysts are noted involving the anterior and posterior

facets of the greater tuberosity. Subcortical cystic changes are also noted

involving the glenoid superiorly and base of the coracoid process



SOFT TISSUES:  The periarticular soft tissues are unremarkable.



IMPRESSION:  

1. Acute high-grade split tear of the long head biceps tendon originates just

proximal to the intertubercular groove extending distally 3.2 cm. There is

moderate tendinosis of the intra-articular long head biceps tendon.

2. Low-grade partial thickness articular sided tear of the mid insertional

supraspinatus tendon with background moderate tendinosis.

3. Severe tendinosis of the subscapularis tendon with low-grade

partial-thickness tear noted along the superior insertional fibers.

4. Mild glenohumeral osteoarthritis.

5. Moderate to severe acromioclavicular osteoarthritis with trace

subacromial/subdeltoid bursitis.



The above report was generated using voice recognition software. It may contain

grammatical, syntax or spelling errors.







Electronically signed by:  Sergo Dahl M.D.

9/5/2017 6:26 PM



Dictated Date/Time:  9/5/2017 6:04 PM

## 2017-11-17 ENCOUNTER — HOSPITAL ENCOUNTER (OUTPATIENT)
Dept: HOSPITAL 45 - C.RAD | Age: 57
Discharge: HOME | End: 2017-11-17
Attending: NURSE PRACTITIONER
Payer: COMMERCIAL

## 2017-11-17 DIAGNOSIS — K22.4: Primary | ICD-10-CM

## 2017-11-17 DIAGNOSIS — K20.9: ICD-10-CM

## 2017-11-17 DIAGNOSIS — K22.70: ICD-10-CM

## 2017-11-17 DIAGNOSIS — K21.9: ICD-10-CM

## 2017-11-17 LAB
BASOPHILS # BLD: 0.02 K/UL (ref 0–0.2)
BASOPHILS NFR BLD: 0.4 %
COMPLETE: YES
EOSINOPHIL NFR BLD AUTO: 239 K/UL (ref 130–400)
HCT VFR BLD CALC: 46.2 % (ref 42–52)
IG%: 0.2 %
IMM GRANULOCYTES NFR BLD AUTO: 26.5 %
LYMPHOCYTES # BLD: 1.23 K/UL (ref 1.2–3.4)
MCH RBC QN AUTO: 32.3 PG (ref 25–34)
MCHC RBC AUTO-ENTMCNC: 33.5 G/DL (ref 32–36)
MCV RBC AUTO: 96.3 FL (ref 80–100)
MONOCYTES NFR BLD: 8.6 %
NEUTROPHILS # BLD AUTO: 1.5 %
NEUTROPHILS NFR BLD AUTO: 62.8 %
PMV BLD AUTO: 10.9 FL (ref 7.4–10.4)
RBC # BLD AUTO: 4.8 M/UL (ref 4.7–6.1)
WBC # BLD AUTO: 4.64 K/UL (ref 4.8–10.8)

## 2017-11-17 NOTE — DIAGNOSTIC IMAGING REPORT
(BARIUM SWALLOW) ESOPHAGUS



CLINICAL HISTORY: K22.4 Esophageal [id number] Ortiz's

[id number]



COMPARISON STUDY: 2/1/2007



FLUOROSCOPY TIME: 1.0 minutes.



FINDINGS: Patient initiated swallowing function well. Esophagus demonstrates a

somewhat diminished motility. There is moderate gastroesophageal reflux. No

significant mucosal lesions are appreciated. Patient ingested the barium tablet

easily which passed easily to the stomach.



IMPRESSION: 

1. Normal swallowing function.





2. Moderately diminished esophageal motility with mild gastroesophageal reflux.













The above report was generated using voice recognition software.  It may contain

grammatical, syntax or spelling errors.







Electronically signed by:  Lul Ortega M.D.

11/17/2017 9:14 AM



Dictated Date/Time:  11/17/2017 9:12 AM

## 2017-11-29 ENCOUNTER — HOSPITAL ENCOUNTER (OUTPATIENT)
Dept: HOSPITAL 45 - C.RAD1850 | Age: 57
Discharge: HOME | End: 2017-11-29
Attending: INTERNAL MEDICINE
Payer: COMMERCIAL

## 2017-11-29 DIAGNOSIS — M54.32: Primary | ICD-10-CM

## 2017-11-29 NOTE — DIAGNOSTIC IMAGING REPORT
L-SPINE FLEX/EXT BENDING MIN 6



HISTORY:  56 years-old Male X acute left-sided low back pain with sciatica



COMPARISON: CT abdomen and pelvis 8/20/2017



TECHNIQUE: 5 views of the lumbar spine with additional lateral flexion and

extension views for a total of 7 images



FINDINGS: 

No acute fracture or subluxation identified. Moderate intervertebral disc space

narrowing is seen at T11-T12, L1-L2, L3-L4 and L4-L5. Multilevel endplate

spurring and facet arthropathy also noted. There is no subluxation with flexion

or extension. No spondylolysis or spondylolisthesis. Mild convex left curvature

of the lumbar spine. Mild degenerative changes are seen within the bilateral

hips. Calcifications of the pelvis suggest vascular etiology.



IMPRESSION: 

1. No acute fracture or subluxation of the lumbar spine.

2. Multilevel degenerative changes as above. 







The above report was generated using voice recognition software. It may contain

grammatical, syntax or spelling errors.







Electronically signed by:  Sergo Dahl M.D.

11/29/2017 5:28 PM



Dictated Date/Time:  11/29/2017 5:25 PM

## 2018-02-07 ENCOUNTER — HOSPITAL ENCOUNTER (OUTPATIENT)
Dept: HOSPITAL 45 - C.CPL | Age: 58
Discharge: HOME | End: 2018-02-07
Attending: PODIATRIST
Payer: COMMERCIAL

## 2018-02-07 DIAGNOSIS — Z01.810: ICD-10-CM

## 2018-02-07 DIAGNOSIS — Z01.818: ICD-10-CM

## 2018-02-07 DIAGNOSIS — Z01.812: Primary | ICD-10-CM

## 2018-02-07 LAB
EOSINOPHIL NFR BLD AUTO: 226 K/UL (ref 130–400)
HCT VFR BLD CALC: 44.1 % (ref 42–52)
HGB BLD-MCNC: 15.3 G/DL (ref 14–18)
INR PPP: 0.9 (ref 0.9–1.1)
MCH RBC QN AUTO: 33.1 PG (ref 25–34)
MCHC RBC AUTO-ENTMCNC: 34.7 G/DL (ref 32–36)
MCV RBC AUTO: 95.5 FL (ref 80–100)
PMV BLD AUTO: 10.5 FL (ref 7.4–10.4)
PTT PATIENT: 25.2 SECONDS (ref 21–31)
RED CELL DISTRIBUTION WIDTH CV: 12.5 % (ref 11.5–14.5)
RED CELL DISTRIBUTION WIDTH SD: 42.6 FL (ref 36.4–46.3)
WBC # BLD AUTO: 5.69 K/UL (ref 4.8–10.8)

## 2018-02-07 NOTE — DIAGNOSTIC IMAGING REPORT
CHEST 2 VIEWS ROUTINE



CLINICAL HISTORY: 57 years-old Male presenting with PRE OP TESTING. 



TECHNIQUE: PA and lateral views of the chest were obtained.



COMPARISON: 8/20/2017.



FINDINGS:

Atherosclerosis of the aortic arch. Cardiac silhouette normal in size. Lungs and

pleural spaces clear. Degenerative changes of the thoracic spine. Upper abdomen

normal.



IMPRESSION:

1.  No acute cardiopulmonary disease.







Electronically signed by:  Paul Goyal M.D.

2/7/2018 9:01 AM



Dictated Date/Time:  2/7/2018 8:57 AM

## 2018-02-12 ENCOUNTER — HOSPITAL ENCOUNTER (OUTPATIENT)
Dept: HOSPITAL 45 - X.SURG | Age: 58
Discharge: HOME | End: 2018-02-12
Attending: PODIATRIST
Payer: COMMERCIAL

## 2018-02-12 VITALS
HEIGHT: 72.99 IN | WEIGHT: 195.42 LBS | WEIGHT: 195.42 LBS | BODY MASS INDEX: 25.9 KG/M2 | HEIGHT: 72.99 IN | BODY MASS INDEX: 25.9 KG/M2

## 2018-02-12 VITALS — HEART RATE: 73 BPM | SYSTOLIC BLOOD PRESSURE: 124 MMHG | DIASTOLIC BLOOD PRESSURE: 78 MMHG | OXYGEN SATURATION: 98 %

## 2018-02-12 VITALS — TEMPERATURE: 98.96 F

## 2018-02-12 DIAGNOSIS — L84: ICD-10-CM

## 2018-02-12 DIAGNOSIS — M1A.9XX1: Primary | ICD-10-CM

## 2018-02-12 DIAGNOSIS — Z88.2: ICD-10-CM

## 2018-02-12 DIAGNOSIS — E78.5: ICD-10-CM

## 2018-02-12 DIAGNOSIS — F32.9: ICD-10-CM

## 2018-02-12 DIAGNOSIS — L85.9: ICD-10-CM

## 2018-02-12 NOTE — [facility] POST OPERATIVE BRIEF NOTE
Immediate Operative Summary


Operative Date


Feb 12, 2018.





Pre-Operative Diagnosis





Right foot corns and callosities, pain





Post-Operative Diagnosis





same as preop





Procedure(s) Performed





Right Foot Third And Fourth Digits Surgical Condylectomy





Surgeon


Dr. Lara





Assistant Surgeon(s)


none





Estimated Blood Loss


2ML





Findings


Consistent with Post-Op Diagnosis





Specimens





NONE





Anesthesia Type


MAC





Complication(s)


none





Disposition


Accompanied Pt To Recovery:  no

## 2018-02-12 NOTE — [facility] OPERATIVE REPORT
Operative Report


Operative Date


Feb 12, 2018.





Pre-Operative Diagnosis





Painful hyperkeratosis lateral 3rd digit and medial 4th digit right foot





Post-Operative Diagnosis





Painful hyperkeratosis lateral 3rd digit and medial 4th digit right foot





Procedure(s) Performed





Condylectomy right 3rd digit and right 4th digit





Surgeon


Jie Lara DPM





Estimated Blood Loss


2 ml





Findings


Gouty tophi 3rd pipj right foot





Specimens





None





Drains


None





Anesthesia Type


MAC





Complication(s)


none





Indications


This is a 57 year old male patient with significant past medical history of 

depression and hypercholesterolemia who presented to our office with the chief 

complaint of a painful callus to the lateral aspect of the right 3rd digit and 

medial aspect of the right 4th digit. Patient has been seen in the office for 

the past several years where he did receive local callus care, consisting of 

debridement and offloading of these lesions, which failed to alleviate his 

symptoms. Patient is in agreement and wishes to proceed with surgery consisting 

of condylectomy of his right 3rd and 4th digits. The perioperative indications, 

planned procedure, possible benefits, risks, complications, and anticipated 

healing time and management were discussed in detail with the patient. He 

understands and elects to proceed with surgery. No guarantees were made. All 

questions have been answered to the patient's satisfaction. Medical clearance 

has been obtained by the patient's primary care physician.





Description of Procedure


Under mild sedation, the patient was brought into the OR and placed on the 

table in the supine position. Final verification of the patient, surgery, and 

limb designation was performed via the time-out procedure. A pneumatic ankle 

tourniquet was placed about the patient's right ankle. IV local sedation was 

then initiated by the anesthesia team. Local block was then administered about 

the operative sites of the right foot. Following IV sedation and local block, 

the right foot was scrubbed, prepped and draped in the usual aseptic manner. An 

Esmarch bandage was used to exsanguinate the patient's right foot and the 

pneumatic ankle tourniquet was inflated to 250 mmHg. At this time, surgery 

began as follows:





Attention was directed to the dorsomedial aspect of the 3rd digit right foot 

over the area of the PIP joint where an approximately 1 cm linear longitudinal 

incision was made. This incision was made just lateral to painful 

hyperkeratosis. Incision was deepened down to the level of bone taking care to 

identify and retract all vital neurovascular structures. Upon reaching the PIP 

joint of the 3rd digit, bright which chalky type substance was identified 

consistent with gouty tophi. This was easily resected with a rongeur. Once the 

head of the proximal phalanx and the base of the middle phalanx were identified

, the lateral condyle of the base of the proximal phalanx was resected and 

passed from the operative field using a football bur. All rough edges were then 

smoothed with a rasp. This procedure was then repeated for the medial aspect of 

the right 4th digit at the DIP joint. An approximately 1 cm linear longitudinal 

incision was made. This incision was made just medial to painful 

hyperkeratosis. Incision was deepened down to the level of bone taking care to 

identify and retract all vital neurovascular structures. Once the head of the 

middle phalanx and the base of the proximal phalanx were identified, the medial 

condyle of the base of the middle phalanx was resected and passed from the 

using a football bur.





At this time, the wounds were flushed with copious amount of NSS. Skin closure 

was performed using 3-0 nylon in simple fashion. A post-operative dressing 

consisting of betadine soaked adaptik, 4x4 gauze, kerlix and an ace wrap were 

applied to the right foot. At this time, the pneumatic ankle tourniquet was 

deflated and prompt hyperemic response was noted to all the digits of the right 

foot. Patient tolerated the procedure and anesthesia well. He was transferred 

to the recovery room with vital signs stable and vascular status intact to the 

right foot.


I attest to the content of the Intraoperative Record and any orders documented 

therein.  Any exceptions are noted below.

## 2018-02-12 NOTE — DISCHARGE INSTRUCTIONS-[facility]
Discharge Instructions


Date of Service


Feb 12, 2018.





Visit


Reason for Visit:  Right Foot Corns And Callosities, Pain





Discharge


Discharge Diagnosis / Problem:  Post-op condylectomy right 3rd and 4th digit





Discharge Goals


Goal(s):  Decrease discomfort, Improve function





Activity Recommendations


Activity Limitations:  as noted below


Shower/Bathe:  keep incision dry


Weightbearing Status:  Right partial


Rest, ice at the ankle, elevate right foot. Partial or heel weight bearing to 

right foot as tolerated. Use crutches for assistance with ambulation as needed. 

Keep dressing clean, dry and intact until post-op visit.





Anesthesia


.





Post Anesthesia Instructions:





If you have had General Anesthesia or IV Sedation:





*  Do not drive today.


*  Resume driving when surgeon permits.


*  Do not make important decisions or sign legal documents today.


*  Call surgeon for:





   1.  Temperature elevations greater than 101 degrees F.


   2.  Uncontrollable pain.


   3.  Excessive bleeding.


   4.  Persistent nausea and vomiting.


   5.  Medication intolerance (nausea, vomiting or rash).





*  For nausea and vomiting use only clear liquids such as: tea, soda, bouillon 

until nausea subsides, then gradually increase diet as tolerated.





*  If you have any concerns or questions, call your surgeon's office.  If 

physician is unavailable and it is an emergency, call 911 or go to the nearest 

emergency room.





.





Instructions / Follow-Up


Instructions / Follow-Up


Follow-up within 1 week in the office with Dr. Lara.





Diet Recommendations


Home Diet:  resume previous diet





Procedures


Procedures Performed:  


Condylectomy right 3rd digit and right 4th digit





Pending Studies


Studies pending at discharge:  no





Medical Emergencies








.


Who to Call and When:





Medical Emergencies:  If at any time you feel your situation is an emergency, 

please call 911 immediately.





.





Non-Emergent Contact


Non-Emergency issues call your:  Primary Care Provider





.


.








"Provider Documentation" section prepared by Jie Lara.








.





[place] Drug Monitoring Program


Search Results:  patient reviewed within database, no issues identified

## 2018-02-12 NOTE — ANESTHESIA PROGRESS NT - [facility]
Anesthesia Post Op Note


Date & Time


Feb 12, 2018 at 14:06





Vital Signs


Pain Intensity:  0





Vital Signs Past 12 Hours








  Date Time  Temp Pulse Resp B/P (MAP) Pulse Ox O2 Delivery O2 Flow Rate FiO2


 


2/12/18 13:47 37.2 71 16 159/87 (111) 95 Room Air  


 


2/12/18 11:14 36.7 87 22 133/114 (120) 96 Room Air  











Notes


Mental Status:  alert / awake / arousable, participated in evaluation


Pt Amnestic to Procedure:  Yes


Nausea / Vomiting:  adequately controlled


Pain:  adequately controlled


Airway Patency, RR, SpO2:  stable & adequate


BP & HR:  stable & adequate


Hydration State:  stable & adequate


Anesthetic Complications:  no major complications apparent

## 2018-02-14 NOTE — DIAGNOSTIC IMAGING REPORT
SURGICNTR FOOT, 2 VIEWS



CLINICAL HISTORY: 57 years-old Male presenting with Post-op portable. 



TECHNIQUE: Frontal and lateral views of the right foot were obtained. 



COMPARISON: None.



FINDINGS:

No acute fracture or malalignment. Mild degenerative changes at the first

metatarsophalangeal joint. Postsurgical changes are not immediately apparent. No

radiographic soft tissue abnormality.



IMPRESSION:

No acute osseous injury. Postsurgical changes are not immediately apparent. No

priors are available for comparison at this time.







Electronically signed by:  Palu Goyal M.D.

2/14/2018 2:38 PM



Dictated Date/Time:  2/14/2018 2:36 PM

## 2018-03-05 ENCOUNTER — HOSPITAL ENCOUNTER (INPATIENT)
Dept: HOSPITAL 45 - C.EDB | Age: 58
LOS: 3 days | Discharge: HOME | DRG: 392 | End: 2018-03-08
Attending: HOSPITALIST | Admitting: STUDENT IN AN ORGANIZED HEALTH CARE EDUCATION/TRAINING PROGRAM
Payer: COMMERCIAL

## 2018-03-05 VITALS — OXYGEN SATURATION: 96 % | SYSTOLIC BLOOD PRESSURE: 141 MMHG | DIASTOLIC BLOOD PRESSURE: 81 MMHG | TEMPERATURE: 98.24 F

## 2018-03-05 VITALS
DIASTOLIC BLOOD PRESSURE: 77 MMHG | HEART RATE: 88 BPM | SYSTOLIC BLOOD PRESSURE: 123 MMHG | OXYGEN SATURATION: 94 % | TEMPERATURE: 98.42 F

## 2018-03-05 VITALS
BODY MASS INDEX: 27.7 KG/M2 | WEIGHT: 209 LBS | BODY MASS INDEX: 27.7 KG/M2 | HEIGHT: 73 IN | HEIGHT: 73 IN | WEIGHT: 209 LBS

## 2018-03-05 DIAGNOSIS — K22.70: ICD-10-CM

## 2018-03-05 DIAGNOSIS — A09: Primary | ICD-10-CM

## 2018-03-05 DIAGNOSIS — Z79.899: ICD-10-CM

## 2018-03-05 DIAGNOSIS — R14.0: ICD-10-CM

## 2018-03-05 DIAGNOSIS — Z88.2: ICD-10-CM

## 2018-03-05 DIAGNOSIS — Z79.82: ICD-10-CM

## 2018-03-05 DIAGNOSIS — K21.9: ICD-10-CM

## 2018-03-05 DIAGNOSIS — Z87.891: ICD-10-CM

## 2018-03-05 LAB
ALBUMIN SERPL-MCNC: 3.8 GM/DL (ref 3.4–5)
ALP SERPL-CCNC: 84 U/L (ref 45–117)
ALT SERPL-CCNC: 65 U/L (ref 12–78)
AST SERPL-CCNC: 44 U/L (ref 15–37)
BASOPHILS # BLD: 0.01 K/UL (ref 0–0.2)
BASOPHILS NFR BLD: 0.2 %
BUN SERPL-MCNC: 21 MG/DL (ref 7–18)
CALCIUM SERPL-MCNC: 9 MG/DL (ref 8.5–10.1)
CO2 SERPL-SCNC: 26 MMOL/L (ref 21–32)
CREAT SERPL-MCNC: 1.35 MG/DL (ref 0.6–1.4)
EOS ABS #: 0 K/UL (ref 0–0.5)
EOSINOPHIL NFR BLD AUTO: 194 K/UL (ref 130–400)
GLUCOSE SERPL-MCNC: 143 MG/DL (ref 70–99)
HCT VFR BLD CALC: 43.3 % (ref 42–52)
HGB BLD-MCNC: 15.3 G/DL (ref 14–18)
IG#: 0.01 K/UL (ref 0–0.02)
IMM GRANULOCYTES NFR BLD AUTO: 2.8 %
INFLUENZA B ANTIGEN: (no result)
LIPASE: 135 U/L (ref 73–393)
LYMPHOCYTES # BLD: 0.18 K/UL (ref 1.2–3.4)
MCH RBC QN AUTO: 33 PG (ref 25–34)
MCHC RBC AUTO-ENTMCNC: 35.3 G/DL (ref 32–36)
MCV RBC AUTO: 93.5 FL (ref 80–100)
MONO ABS #: 0.37 K/UL (ref 0.11–0.59)
MONOCYTES NFR BLD: 5.9 %
NEUT ABS #: 5.75 K/UL (ref 1.4–6.5)
NEUTROPHILS # BLD AUTO: 0 %
NEUTROPHILS NFR BLD AUTO: 90.9 %
PMV BLD AUTO: 9.9 FL (ref 7.4–10.4)
POTASSIUM SERPL-SCNC: 3.9 MMOL/L (ref 3.5–5.1)
PROT SERPL-MCNC: 7.6 GM/DL (ref 6.4–8.2)
RED CELL DISTRIBUTION WIDTH CV: 12.6 % (ref 11.5–14.5)
RED CELL DISTRIBUTION WIDTH SD: 43 FL (ref 36.4–46.3)
SODIUM SERPL-SCNC: 132 MMOL/L (ref 136–145)
WBC # BLD AUTO: 6.32 K/UL (ref 4.8–10.8)

## 2018-03-05 RX ADMIN — SODIUM CHLORIDE SCH MLS/HR: 900 INJECTION, SOLUTION INTRAVENOUS at 22:25

## 2018-03-05 RX ADMIN — PIPERACILLIN AND TAZOBACTAM SCH MLS/HR: 3; .375 INJECTION, POWDER, LYOPHILIZED, FOR SOLUTION INTRAVENOUS; PARENTERAL at 23:32

## 2018-03-05 RX ADMIN — KETOROLAC TROMETHAMINE SCH MG: 15 INJECTION INTRAMUSCULAR; INTRAVENOUS at 22:25

## 2018-03-05 NOTE — HISTORY AND PHYSICAL
History & Physical


Date & Time of Service:


Mar 5, 2018 at 20:47


Chief Complaint:


FLU


Primary Care Physician:


LEILA De La Rosa MD


History of Present Illness


Source:  patient


Patient is a 57 year old male with IBS and Barrets Esophagus that presents with 

a 3 week history of worsening abdominal pain. The patient has been having 

intermittent abdominal pain every 3 days over the last few weeks that at can be 

anywhere between a 6-10/10, crampy to turning pain, changes location between 

the upper and lower quadrants, not associated with bowel movements, and does 

not resolve or worsen with any specific therapies. The patient come in to the 

hospital today because he began having fever, chills, and muscle aches this 

morning and was sent over by his PCP Dr. Alcantara over concerns of abdominal 

distention and a bowel obstruction. He has also been complaining of a recent 

cough with sputum production for the last few days with associated shortness of 

breath this morning. The patient did have several bowel movements today that 

started out as hard pellets and progressed to regular stools that were not 

loose or runny. He is currently complaining of 6/10 abdominal pain after having 

4mg of morphine in the ED. The patient also requests not having an NG tube at 

this time if it can be avoided due to severe gag reflex. The patient has 

followed with Dr. Licea in the past for multiple bowel obstructions that have 

been worked up to be secondary to acute inflammation from IBS. He gets 

colonoscopies q5 years and had his last one approximately 2 years ago. He has a 

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 

at [facility] on 03/20/18 for possible Botox injection into the Esophagus.





Family History


Noncontributory





Social History


Smoking Status:  Never Smoker


Smokeless Tobacco Use:  No


Alcohol Use:  none


Drug Use:  none


Marital Status:  


Housing status:  lives with family


Occupational Status:  employed





Immunizations


History of Influenza Vaccine:  Unknown


History of Tetanus Vaccine?:  Unknown


History of Pneumococcal:  Unknown


History of Hepatitis B Vaccine:  Unknown





Allergies


Coded Allergies:  


     Sulfa Antibiotics (Verified  Allergy, Intermediate, SEVERE HIVES/ITCHING, 3

/6/18)





Home Medications


Scheduled


Aspirin (Aspirin Ec), 81 MG PO HS


Atorvastatin (Lipitor), 20 MG PO HS


Cholecalciferol (Vitamin D), 2,000 INTER.UNIT PO QAM


Fiber Laxative (Fiber Laxative), 1 TAB PO QAM


Lansoprazole (Prevacid), 30 MG PO BID


Probiotic Product (Probiotic), 1 CAP PO DAILY





Scheduled PRN


Lorazepam (Lorazepam), 0.5 MG PO DAILY PRN for Anxiety





Review of Systems


Constitutional:  + fever, + chills, + fatigue, No sweats, No weight loss


ENT:  No sore throat, No trouble swallowing


Respiratory:  + cough, + sputum, No shortness of breath


Cardiovascular:  No chest pain, No palpitations


Abdomen:  + pain, + nausea, No vomiting, No diarrhea, No constipation, No GI 

bleeding


Musculoskeletal:  + muscle pain, No joint pain, No calf pain





Physical Exam


Vital Signs











  Date Time  Temp Pulse Resp B/P (MAP) Pulse Ox O2 Delivery O2 Flow Rate FiO2


 


3/5/18 19:01  96 18 123/71 95 Room Air  


 


3/5/18 17:05  108      


 


3/5/18 16:57  109 14 132/87    


 


3/5/18 16:02 38.5 119 18 124/80 98 Room Air  








General Appearance:  WD/WN, + mild distress


Eyes:  normal inspection, sclerae normal


Neck:  supple, trachea midline


Respiratory/Chest:  chest non-tender, lungs clear, normal breath sounds, no 

respiratory distress, no accessory muscle use


Cardiovascular:  regular rate, rhythm, no edema, no gallop, no murmur


Abdomen/GI:  normal bowel sounds, soft, + distended


Neurologic/Psych:  alert, oriented x 3





Diagnostics


Laboratory Results





Results Past 24 Hours








Test


  3/5/18


17:31 3/5/18


17:40 3/5/18


18:31 3/5/18


20:01 Range/Units


 


 


White Blood Count 6.32    4.8-10.8  K/uL


 


Red Blood Count 4.63    4.7-6.1  M/uL


 


Hemoglobin 15.3    14.0-18.0  g/dL


 


Hematocrit 43.3    42-52  %


 


Mean Corpuscular Volume 93.5      fL


 


Mean Corpuscular Hemoglobin 33.0    25-34  pg


 


Mean Corpuscular Hemoglobin


Concent 35.3


  


  


  


  32-36  g/dl


 


 


Platelet Count 194    130-400  K/uL


 


Mean Platelet Volume 9.9    7.4-10.4  fL


 


Neutrophils (%) (Auto) 90.9     %


 


Lymphocytes (%) (Auto) 2.8     %


 


Monocytes (%) (Auto) 5.9     %


 


Eosinophils (%) (Auto) 0.0     %


 


Basophils (%) (Auto) 0.2     %


 


Neutrophils # (Auto) 5.75    1.4-6.5  K/uL


 


Lymphocytes # (Auto) 0.18    1.2-3.4  K/uL


 


Monocytes # (Auto) 0.37    0.11-0.59  K/uL


 


Eosinophils # (Auto) 0.00    0-0.5  K/uL


 


Basophils # (Auto) 0.01    0-0.2  K/uL


 


RDW Standard Deviation 43.0    36.4-46.3  fL


 


RDW Coefficient of Variation 12.6    11.5-14.5  %


 


Immature Granulocyte % (Auto) 0.2     %


 


Immature Granulocyte # (Auto) 0.01    0.00-0.02  K/uL


 


Erythrocyte Sedimentation Rate 8    0-14  mm/hr


 


Sodium Level 132    136-145  mmol/L


 


Potassium Level 3.9    3.5-5.1  mmol/L


 


Chloride Level 97      mmol/L


 


Carbon Dioxide Level 26    21-32  mmol/L


 


Anion Gap 9.0    3-11  mmol/L


 


Blood Urea Nitrogen 21    7-18  mg/dl


 


Creatinine


  1.35


  


  


  


  0.60-1.40


mg/dl


 


Est Creatinine Clear Calc


Drug Dose 66.6


  


  


  


   ml/min


 


 


Estimated GFR (


American) 66.1


  


  


  


   


 


 


Estimated GFR (Non-


American 57.1


  


  


  


   


 


 


BUN/Creatinine Ratio 15.4    10-20  


 


Random Glucose 143    70-99  mg/dl


 


Calcium Level 9.0    8.5-10.1  mg/dl


 


Total Bilirubin 1.1    0.2-1  mg/dl


 


Direct Bilirubin 0.3    0-0.2  mg/dl


 


Aspartate Amino Transf


(AST/SGOT) 44


  


  


  


  15-37  U/L


 


 


Alanine Aminotransferase


(ALT/SGPT) 65


  


  


  


  12-78  U/L


 


 


Alkaline Phosphatase 84      U/L


 


C-Reactive Protein 1.85    0-0.29  mg/dl


 


Total Protein 7.6    6.4-8.2  gm/dl


 


Albumin 3.8    3.4-5.0  gm/dl


 


Lipase 135      U/L


 


Procalcitonin 0.60    0-0.5  ng/ml


 


Influenza Type A Antigen  Neg for Influ A   NEG  


 


Influenza Type B Antigen  Neg for Influ B   NEG  


 


Urine Color   YELLOW   


 


Urine Appearance   CLEAR  CLEAR  


 


Urine pH   6.0  4.5-7.5  


 


Urine Specific Gravity   1.043  1.000-1.030  


 


Urine Protein   NEG  NEG  


 


Urine Glucose (UA)   NEG  NEG  


 


Urine Ketones   TRACE  NEG  


 


Urine Occult Blood   NEG  NEG  


 


Urine Nitrite   NEG  NEG  


 


Urine Bilirubin   NEG  NEG  


 


Urine Urobilinogen   NEG  NEG  


 


Urine Leukocyte Esterase   NEG  NEG  


 


Lactic Acid Level    0.8 0.4-2.0  mmol/L











Impression


Assessment and Plan


Patient is a 57 year old male with IBS and Barrets Esophagus that presents with 

a 3 week history of worsening abdominal pain





Small Bowel Obstruction


- CT Abdomen:


1. Findings consistent with partial mid to distal small bowel obstructive 

change.


2. No evidence for a well-defined obstructing lesion, although there is a 

significant decrease in caliber of the mid and distal small bowel at several 

locations suggesting nonspecific small bowel inflammatory change versus 

enteritis.


3. No evidence for abscess or collection.


- NPO


- Pain Control with Tylenol, Toradol, and Morphine if needed


- GI Consult


- IV NS @ 125 mls/hr





Infectious Enteritis


- Zosyn 3.375 IV q8h


- Blood Cultures x 2 (Already received 1 dose of Zosyn in ED prior)


- No leukocytosis at this time --> Daily CBC


- GI consultation





GERD/ Ortiz Esophagus


- IV Protonix





Home Meds


- Holding home Aspirin, Lipitor, and Lorazepam while NPO





DVT


- SCDs





Code Status


- Full Resuscitation








Attending addendum:








I have physically seen this patient, have supervised the medical residents 

activities, and agree with the H&P unless as otherwise noted.





Assessment and Plan:





Small bowel obstruction/infectious or inflammatory enteritis--


Admit to medical surgical floor.


N.p.o.


IV fluids


Zosyn 3.375 mg IV every 8 hours


Protonix 40 mg IV daily


 Zofran 4 mg IV every 6 hours as needed


Consult gastroenterology





Advanced Directives


Existing Advance Directive:  No


Existing Living Will:  No


Existing Power of :  No





Resuscitation Status


Full Code





VTE Prophylaxis


Will order VTE Prophylaxis:  Yes





Resident Tracking


Resident Involvement:  Resident Care Provided


Care Provided:  Adult Hospital Medicine

## 2018-03-05 NOTE — EMERGENCY ROOM VISIT NOTE
ED Visit Note


First contact with patient:  16:30


CHIEF  COMPLAINT: Abdominal pain





HISTORY OF PRESENTING ILLNESS: This is a 57-year-old male who presents to the 

emergency department with complaint of abdominal pain and bloating.  Patient 

states he has been having some abdominal pain and bloating issues off and on 

for the past 2-3 weeks, but his symptoms became much more severe today.  He 

does report a history of bowel obstruction in the past, and states this feels 

somewhat similar to his previous bowel obstructions.  Today he has also had 

some associated chills and body aches.  He states he has a chronic cough 

associated with history of Ortiz's esophagus, but states that the past 2 days 

he has been having some phlegm production with cough, and feeling like his 

cough is slightly worse and a little short of breath.  He denies chest pain.  

He states his abdominal pain has become constant, dull ache with intermittent 

sharp stabbing pains, nothing makes it better or worse, 8/10.  He states that 

his abdomen is bloated and more distended than normal.  He does report that he 

has had several normal bowel movements today and is still passing gas.  He 

denies any constipation or diarrhea, bloody or black stools recently.  He 

denies headaches, vision changes, neck pain or stiffness, back pain, urinary 

symptoms, or rash.





REVIEW OF SYSTEMS: A complete 10 point review of systems was reviewed with the 

patient with pertinent positives and negatives as per history of present 

illness. All else were negative.





PAST MEDICAL HISTORY: Reviewed in chart.





SOCIAL HISTORY: Lives at home with family.  Former smoker, quit 40 years ago.  

Denies alcohol or recreational drug use.





ALLERGIES: Reviewed in chart.





PHYSICAL EXAM:


CONSTITUTIONAL: Pleasant and cooperative.  No acute distress, but does appear 

uncomfortable and in pain during exam.  Mildly dehydrated.


HEENT: Normocephalic, atraumatic. Pupils equal, round and reactive to light, 

EOMI. TMs normal. Pharynx normal.  Tacky mucous membranes.


NECK: Supple, full active range of motion without discomfort.  No cervical 

adenopathy.


RESPIRATORY: Clear to auscultation bilaterally with no wheezing, crackles, 

rhonchi or stridor. Equal expansion bilaterally.


CARDIOVASCULAR: Regular rate and rhythm with no murmurs, rubs or gallops. 

Normal peripheral perfusion. No edema.


GASTROINTESTINAL: Soft, diffusely tender throughout, most tender around the gaudencio

-umbilicus and left lower quadrant.  Distended abdomen.  No rebound tenderness.

  No palpable masses or HSM.  Hypoactive bowel has no CVA tenderness.  Sounds 

present in all quadrants. 


MUSCULOSKELETAL: Full range of motion of all joints without discomfort.


INTEGUMENTARY: No rash or other significant dermatologic conditions noted.


NEUROLOGIC: Alert and oriented X 4 with normal affect.  Normal strength and 

sensation in all 4 extremities.  No focal neurologic deficits noted.  Normal 

speech.  Normal gait observed.








ED COURSE AND MEDICAL DECISION MAKING: 





CC: Patient presenting with complaint of abdominal pain and distention, chills





DIFFERENTIAL DIAGNOSIS:  Includes, but not limited to small bowel obstruction, 

ileus, gastroenteritis, gastritis, peptic ulcer disease, colitis, diverticulitis

, cholecystitis, cholelithiasis, pancreatitis, intra-abdominal abscess, 

inflammatory bowel disease, other infectious process including influenza, 

pneumonia, UTI, among others.





INTERPRETATION OF LABS: No leukocytosis, no anemia, mild hyponatremia and 

hyperglycemia, no other significant electrolyte abnormalities, normal renal 

function, elevated T bili, liver enzymes otherwise normal, normal lipase.  UA 

negative.  Normal lactic acid.  Elevated pro calcitonin.  Normal ESR, elevated 

CRP.





IMAGING:  


SINGLE VIEW CHEST





CLINICAL HISTORY:  Cough and fever.





FINDINGS: An AP, portable, upright chest radiograph is compared to study dated


2/20/2018 and correlated with chest CT dated 1/5/2016. The examination is


degraded by portable technique and patient rotation.  The heart is enlarged. The


pulmonary vasculature is noncongested. Bibasilar atelectasis is observed. The


lungs and pleural spaces are otherwise clear. No pneumothorax is seen. The bony


thorax is grossly intact.





IMPRESSION: Cardiomegaly with no acute cardiopulmonary abnormality. 





-----





ABD/PELVIS IV CONTRAST ONLY





CT DOSE: 640.12 mGy.cm





HISTORY: Pain  eval perf, infectious process, SBO





TECHNIQUE: Multiaxial CT images of the abdomen and pelvis were performed


following the use of intravenous contrast.  A dose lowering technique was


utilized adhering to the principles of ALARA.








COMPARISON STUDY: None.





FINDINGS: Minimal bibasilar atelectasis. Liver is uniform as is the spleen and


pancreas. The adrenal glands are unremarkable. The kidneys demonstrate several


small cortical cysts. There are negative for hydronephrosis.





Mild distention of several loops of proximal to mid small bowel. Colon shows no


evidence for distention. The distal small bowel shows evidence for a small


caliber with evidence for hyperemia involving the terminal ileum as well as


components of the left central abdominal region. This appearance suggests


nonspecific enteritis versus inflammatory bowel change. A well-defined


obstructing mass process is not appreciated. No significant abdominal pelvic or


inguinal adenopathy.





IMPRESSION: 





1. Findings consistent with partial mid to distal small bowel obstructive


change.


2. No evidence for a well-defined obstructing lesion, although there is a


significant decrease in caliber of the mid and distal small bowel 


At several locations suggesting nonspecific small bowel inflammatory change


versus enteritis.


3. No evidence for abscess or collection.








MEDICATION RECONCILIATION:  I attest that I have personally reviewed the patient

's current medication list.





INITIAL VITAL SIGNS REVIEW:  I reviewed the patient's initial vital signs and 

interpret them as follows: T: Febrile;  BP: Normotensive;  HR: Tachycardia;  RR

: Within normal limit;  Pulse Ox: Within normal limits on room air.


Blood pressure screening: The patient was found to have normal blood pressure 

on screening and does not require follow-up for repeat blood pressure check.





SUMMARY: 


Patient was evaluated at bedside, history and physical exam performed.


Patient is alert and oriented, no acute distress but does appear uncomfortable 

during exam, resting calmly in the stretcher.


Patient has diffuse abdominal pain with distention, but the abdomen is soft.  

Hypoactive bowel sounds.


Lungs clear to auscultation, tight raspy cough noted.  He does appear mildly 

dehydrated.


Given the fact that he is tolerating p.o., passing gas and moving bowels 

normally, I think it is less likely that he has a bowel obstruction.


The presence of fevers and chills with tachycardia is more concerning for an 

infectious etiology.


Orders were placed at bedside for labs, UA, IV fluids for hydration, IV Tylenol 

for fever, IV morphine for pain, IV Zofran for nausea, CT abdomen/pelvis with 

IV contrast to evaluate for intra-abdominal pathology.


Patient discussed with Dr. Dominguez, who agrees with my assessment and plan.


Labs and imaging reviewed as above, findings concerning for partial small bowel 

obstruction, as well as inflammatory changes concerning for possible infection 

or IBD.


Given patient's presentation with fevers and chills, will treat for suspected 

GI infection with IV Zosyn.


I spoke with Dr. Pollard, hospitalist, who agrees to evaluate the patient 

for admission.


Patient reassessed multiple times throughout ED stay, he remained stable, 

tachycardia downtrending, and reports his pain has been well controlled.


Patient was updated on all results and plan for admission, he verbalized 

understanding and was agreeable to this plan.


Patient was stable at time of admission.


Problem List


Medical Problems:


(1) Esophageal dysmotility


Status: Chronic  





(2) Gastroesophageal reflux disease


Status: Chronic  





(3) Kidney stone


Status: Resolved  











Current/Historical Medications


Scheduled


Aspirin (Aspirin Ec), 81 MG PO HS


Atorvastatin (Lipitor), 20 MG PO HS


Cholecalciferol (Vitamin D), 2,000 INTER.UNIT PO QAM


Fiber Laxative (Fiber Laxative), 1 TAB PO QAM


Lansoprazole (Prevacid), 30 MG PO BID


Probiotic Product (Probiotic), 1 CAP PO DAILY





Scheduled PRN


Lorazepam (Lorazepam), 0.5 MG PO DAILY PRN for Anxiety





Allergies


Coded Allergies:  


     Sulfa Antibiotics (Verified  Allergy, Severe, SEVERE HIVES/ITCHING, 2/12/18

)





Vital Signs











  Date Time  Temp Pulse Resp B/P (MAP) Pulse Ox O2 Delivery O2 Flow Rate FiO2


 


3/5/18 19:01  96 18 123/71 95 Room Air  


 


3/5/18 17:05  108      


 


3/5/18 16:57  109 14 132/87    


 


3/5/18 16:02 38.5 119 18 124/80 98 Room Air  











Laboratory Results


3/5/18 17:31








Red Blood Count 4.63, Mean Corpuscular Volume 93.5, Mean Corpuscular Hemoglobin 

33.0, Mean Corpuscular Hemoglobin Concent 35.3, Mean Platelet Volume 9.9, 

Neutrophils (%) (Auto) 90.9, Lymphocytes (%) (Auto) 2.8, Monocytes (%) (Auto) 

5.9, Eosinophils (%) (Auto) 0.0, Basophils (%) (Auto) 0.2, Neutrophils # (Auto) 

5.75, Lymphocytes # (Auto) 0.18, Monocytes # (Auto) 0.37, Eosinophils # (Auto) 

0.00, Basophils # (Auto) 0.01





3/5/18 17:31

















Test


  3/5/18


17:31 3/5/18


17:40 3/5/18


18:31 3/5/18


20:01


 


White Blood Count


  6.32 K/uL


(4.8-10.8) 


  


  


 


 


Red Blood Count


  4.63 M/uL


(4.7-6.1) 


  


  


 


 


Hemoglobin


  15.3 g/dL


(14.0-18.0) 


  


  


 


 


Hematocrit 43.3 % (42-52)    


 


Mean Corpuscular Volume


  93.5 fL


() 


  


  


 


 


Mean Corpuscular Hemoglobin


  33.0 pg


(25-34) 


  


  


 


 


Mean Corpuscular Hemoglobin


Concent 35.3 g/dl


(32-36) 


  


  


 


 


Platelet Count


  194 K/uL


(130-400) 


  


  


 


 


Mean Platelet Volume


  9.9 fL


(7.4-10.4) 


  


  


 


 


Neutrophils (%) (Auto) 90.9 %    


 


Lymphocytes (%) (Auto) 2.8 %    


 


Monocytes (%) (Auto) 5.9 %    


 


Eosinophils (%) (Auto) 0.0 %    


 


Basophils (%) (Auto) 0.2 %    


 


Neutrophils # (Auto)


  5.75 K/uL


(1.4-6.5) 


  


  


 


 


Lymphocytes # (Auto)


  0.18 K/uL


(1.2-3.4) 


  


  


 


 


Monocytes # (Auto)


  0.37 K/uL


(0.11-0.59) 


  


  


 


 


Eosinophils # (Auto)


  0.00 K/uL


(0-0.5) 


  


  


 


 


Basophils # (Auto)


  0.01 K/uL


(0-0.2) 


  


  


 


 


RDW Standard Deviation


  43.0 fL


(36.4-46.3) 


  


  


 


 


RDW Coefficient of Variation


  12.6 %


(11.5-14.5) 


  


  


 


 


Immature Granulocyte % (Auto) 0.2 %    


 


Immature Granulocyte # (Auto)


  0.01 K/uL


(0.00-0.02) 


  


  


 


 


Erythrocyte Sedimentation Rate 8 mm/hr (0-14)    


 


Anion Gap


  9.0 mmol/L


(3-11) 


  


  


 


 


Est Creatinine Clear Calc


Drug Dose 66.6 ml/min 


  


  


  


 


 


Estimated GFR (


American) 66.1 


  


  


  


 


 


Estimated GFR (Non-


American 57.1 


  


  


  


 


 


BUN/Creatinine Ratio 15.4 (10-20)    


 


Calcium Level


  9.0 mg/dl


(8.5-10.1) 


  


  


 


 


Total Bilirubin


  1.1 mg/dl


(0.2-1) 


  


  


 


 


Direct Bilirubin


  0.3 mg/dl


(0-0.2) 


  


  


 


 


Aspartate Amino Transf


(AST/SGOT) 44 U/L (15-37) 


  


  


  


 


 


Alanine Aminotransferase


(ALT/SGPT) 65 U/L (12-78) 


  


  


  


 


 


Alkaline Phosphatase


  84 U/L


() 


  


  


 


 


C-Reactive Protein


  1.85 mg/dl


(0-0.29) 


  


  


 


 


Total Protein


  7.6 gm/dl


(6.4-8.2) 


  


  


 


 


Albumin


  3.8 gm/dl


(3.4-5.0) 


  


  


 


 


Lipase


  135 U/L


() 


  


  


 


 


Procalcitonin


  0.60 ng/ml


(0-0.5) 


  


  


 


 


Influenza Type A Antigen


  


  Neg for Influ


A (NEG) 


  


 


 


Influenza Type B Antigen


  


  Neg for Influ


B (NEG) 


  


 


 


Urine Color   YELLOW  


 


Urine Appearance   CLEAR (CLEAR)  


 


Urine pH   6.0 (4.5-7.5)  


 


Urine Specific Gravity


  


  


  1.043


(1.000-1.030) 


 


 


Urine Protein   NEG (NEG)  


 


Urine Glucose (UA)   NEG (NEG)  


 


Urine Ketones   TRACE (NEG)  


 


Urine Occult Blood   NEG (NEG)  


 


Urine Nitrite   NEG (NEG)  


 


Urine Bilirubin   NEG (NEG)  


 


Urine Urobilinogen   NEG (NEG)  


 


Urine Leukocyte Esterase   NEG (NEG)  


 


Lactic Acid Level


  


  


  


  0.8 mmol/L


(0.4-2.0)











Medications Administered











 Medications


  (Trade)  Dose


 Ordered  Sig/Romario


 Route  Start Time


 Stop Time Status Last Admin


Dose Admin


 


 Sodium Chloride  1,000 ml @ 


 999 mls/hr  Q1H1M STAT


 IV  3/5/18 16:46


 3/5/18 17:46 DC 3/5/18 17:31


999 MLS/HR


 


 Acetaminophen  100 ml @ 


 400 mls/hr  NOW  STAT


 IV  3/5/18 16:46


 3/5/18 17:08 DC 3/5/18 17:31


400 MLS/HR


 


 Morphine Sulfate


  (MoRPHine


 SULFATE INJ)  4 mg  NOW  STAT


 IV  3/5/18 16:46


 3/5/18 17:08 DC 3/5/18 17:31


4 MG


 


 Ondansetron HCl


  (Zofran Inj)  4 mg  NOW  STAT


 IV  3/5/18 16:46


 3/5/18 17:08 DC 3/5/18 17:30


4 MG


 


 Piperacillin Sod/


 Tazobactam Sod


  (Zosyn Iv)  4.5 gm  NOW  STAT


 IV  3/5/18 19:29


 3/5/18 19:32 DC 3/5/18 19:39


4.5 GM


 


 Sodium Chloride  1,000 ml @ 


 150 mls/hr  Q6H40M STAT


 IV  3/5/18 19:47


 3/5/18 22:17 DC 3/5/18 19:47


150 MLS/HR











Departure Information


Impression





 Primary Impression:  


 Small bowel obstruction


 Additional Impression:  


 Fever





Dispostion


Admitted as an inpatient





Condition


FAIR





Referrals


No Doctor, Assigned (PCP)





Patient Instructions


[facility]





Problem Qualifiers








 Additional Impression:  


 Fever


 Fever type:  due to other condition  Qualified Codes:  R50.81 - Fever 

presenting with conditions classified elsewhere

## 2018-03-05 NOTE — DIAGNOSTIC IMAGING REPORT
SINGLE VIEW CHEST



CLINICAL HISTORY:  Cough and fever.



FINDINGS: An AP, portable, upright chest radiograph is compared to study dated

2/20/2018 and correlated with chest CT dated 1/5/2016. The examination is

degraded by portable technique and patient rotation.  The heart is enlarged. The

pulmonary vasculature is noncongested. Bibasilar atelectasis is observed. The

lungs and pleural spaces are otherwise clear. No pneumothorax is seen. The bony

thorax is grossly intact.



IMPRESSION: Cardiomegaly with no acute cardiopulmonary abnormality. 







Electronically signed by:  Michael Purvis M.D.

3/5/2018 5:42 PM



Dictated Date/Time:  3/5/2018 5:41 PM

## 2018-03-05 NOTE — DIAGNOSTIC IMAGING REPORT
ABD/PELVIS IV CONTRAST ONLY



CT DOSE: 640.12 mGy.cm



HISTORY: Pain  eval perf, infectious process, SBO



TECHNIQUE: Multiaxial CT images of the abdomen and pelvis were performed

following the use of intravenous contrast.  A dose lowering technique was

utilized adhering to the principles of ALARA.





COMPARISON STUDY: None.



FINDINGS: Minimal bibasilar atelectasis. Liver is uniform as is the spleen and

pancreas. The adrenal glands are unremarkable. The kidneys demonstrate several

small cortical cysts. There are negative for hydronephrosis.



Mild distention of several loops of proximal to mid small bowel. Colon shows no

evidence for distention. The distal small bowel shows evidence for a small

caliber with evidence for hyperemia involving the terminal ileum as well as

components of the left central abdominal region. This appearance suggests

nonspecific enteritis versus inflammatory bowel change. A well-defined

obstructing mass process is not appreciated. No significant abdominal pelvic or

inguinal adenopathy.



IMPRESSION: 



1. Findings consistent with partial mid to distal small bowel obstructive

change.

2. No evidence for a well-defined obstructing lesion, although there is a

significant decrease in caliber of the mid and distal small bowel 

At several locations suggesting nonspecific small bowel inflammatory change

versus enteritis.

3. No evidence for abscess or collection.





The above report was generated using voice recognition software.  It may contain

grammatical, syntax or spelling errors.







Electronically signed by:  Lul Ortega M.D.

3/5/2018 6:29 PM



Dictated Date/Time:  3/5/2018 6:24 PM

## 2018-03-06 VITALS
HEART RATE: 59 BPM | TEMPERATURE: 98.06 F | SYSTOLIC BLOOD PRESSURE: 155 MMHG | OXYGEN SATURATION: 96 % | DIASTOLIC BLOOD PRESSURE: 62 MMHG

## 2018-03-06 VITALS
OXYGEN SATURATION: 98 % | HEART RATE: 56 BPM | DIASTOLIC BLOOD PRESSURE: 89 MMHG | TEMPERATURE: 97.52 F | SYSTOLIC BLOOD PRESSURE: 157 MMHG

## 2018-03-06 VITALS
OXYGEN SATURATION: 94 % | TEMPERATURE: 98.06 F | SYSTOLIC BLOOD PRESSURE: 114 MMHG | HEART RATE: 64 BPM | DIASTOLIC BLOOD PRESSURE: 67 MMHG

## 2018-03-06 LAB
BASOPHILS # BLD: 0 K/UL (ref 0–0.2)
BASOPHILS NFR BLD: 0 %
BUN SERPL-MCNC: 22 MG/DL (ref 7–18)
CALCIUM SERPL-MCNC: 8.1 MG/DL (ref 8.5–10.1)
CO2 SERPL-SCNC: 24 MMOL/L (ref 21–32)
CREAT SERPL-MCNC: 1.52 MG/DL (ref 0.6–1.4)
EOS ABS #: 0 K/UL (ref 0–0.5)
EOSINOPHIL NFR BLD AUTO: 156 K/UL (ref 130–400)
GLUCOSE SERPL-MCNC: 110 MG/DL (ref 70–99)
HCT VFR BLD CALC: 38.3 % (ref 42–52)
HGB BLD-MCNC: 13.3 G/DL (ref 14–18)
IG#: 0.01 K/UL (ref 0–0.02)
IMM GRANULOCYTES NFR BLD AUTO: 14.7 %
LYMPHOCYTES # BLD: 0.52 K/UL (ref 1.2–3.4)
MCH RBC QN AUTO: 32.4 PG (ref 25–34)
MCHC RBC AUTO-ENTMCNC: 34.7 G/DL (ref 32–36)
MCV RBC AUTO: 93.2 FL (ref 80–100)
MONO ABS #: 0.24 K/UL (ref 0.11–0.59)
MONOCYTES NFR BLD: 6.8 %
NEUT ABS #: 2.77 K/UL (ref 1.4–6.5)
NEUTROPHILS # BLD AUTO: 0 %
NEUTROPHILS NFR BLD AUTO: 78.2 %
PMV BLD AUTO: 9.7 FL (ref 7.4–10.4)
POTASSIUM SERPL-SCNC: 3.4 MMOL/L (ref 3.5–5.1)
RED CELL DISTRIBUTION WIDTH CV: 12.6 % (ref 11.5–14.5)
RED CELL DISTRIBUTION WIDTH SD: 43.1 FL (ref 36.4–46.3)
SODIUM SERPL-SCNC: 135 MMOL/L (ref 136–145)
WBC # BLD AUTO: 3.54 K/UL (ref 4.8–10.8)

## 2018-03-06 RX ADMIN — KETOROLAC TROMETHAMINE SCH MG: 15 INJECTION INTRAMUSCULAR; INTRAVENOUS at 22:47

## 2018-03-06 RX ADMIN — POTASSIUM CHLORIDE SCH MLS/HR: 10 INJECTION, SOLUTION INTRAVENOUS at 11:12

## 2018-03-06 RX ADMIN — PIPERACILLIN AND TAZOBACTAM SCH MLS/HR: 3; .375 INJECTION, POWDER, LYOPHILIZED, FOR SOLUTION INTRAVENOUS; PARENTERAL at 05:54

## 2018-03-06 RX ADMIN — KETOROLAC TROMETHAMINE SCH MG: 15 INJECTION INTRAMUSCULAR; INTRAVENOUS at 05:54

## 2018-03-06 RX ADMIN — PANTOPRAZOLE SODIUM SCH MLS/MIN: 40 INJECTION, POWDER, FOR SOLUTION INTRAVENOUS at 11:06

## 2018-03-06 RX ADMIN — SODIUM CHLORIDE SCH MLS/HR: 900 INJECTION, SOLUTION INTRAVENOUS at 23:30

## 2018-03-06 RX ADMIN — ACETAMINOPHEN SCH MLS/HR: 10 INJECTION, SOLUTION INTRAVENOUS at 19:04

## 2018-03-06 RX ADMIN — KETOROLAC TROMETHAMINE SCH MG: 15 INJECTION INTRAMUSCULAR; INTRAVENOUS at 15:18

## 2018-03-06 RX ADMIN — PIPERACILLIN AND TAZOBACTAM SCH MLS/HR: 3; .375 INJECTION, POWDER, LYOPHILIZED, FOR SOLUTION INTRAVENOUS; PARENTERAL at 21:38

## 2018-03-06 RX ADMIN — KETOROLAC TROMETHAMINE SCH MG: 15 INJECTION INTRAMUSCULAR; INTRAVENOUS at 19:05

## 2018-03-06 RX ADMIN — KETOROLAC TROMETHAMINE SCH MG: 15 INJECTION INTRAMUSCULAR; INTRAVENOUS at 11:06

## 2018-03-06 RX ADMIN — ACETAMINOPHEN SCH MLS/HR: 10 INJECTION, SOLUTION INTRAVENOUS at 02:00

## 2018-03-06 RX ADMIN — SODIUM CHLORIDE SCH MLS/HR: 900 INJECTION, SOLUTION INTRAVENOUS at 16:00

## 2018-03-06 RX ADMIN — KETOROLAC TROMETHAMINE SCH MG: 15 INJECTION INTRAMUSCULAR; INTRAVENOUS at 02:02

## 2018-03-06 RX ADMIN — SODIUM CHLORIDE SCH MLS/HR: 900 INJECTION, SOLUTION INTRAVENOUS at 05:54

## 2018-03-06 RX ADMIN — PIPERACILLIN AND TAZOBACTAM SCH MLS/HR: 3; .375 INJECTION, POWDER, LYOPHILIZED, FOR SOLUTION INTRAVENOUS; PARENTERAL at 13:49

## 2018-03-06 RX ADMIN — ACETAMINOPHEN SCH MLS/HR: 10 INJECTION, SOLUTION INTRAVENOUS at 11:06

## 2018-03-06 RX ADMIN — POTASSIUM CHLORIDE SCH MLS/HR: 10 INJECTION, SOLUTION INTRAVENOUS at 13:50

## 2018-03-06 NOTE — FAMILY MEDICINE PROGRESS NOTE
Progress Note


Date of Service


Mar 6, 2018.





Subjective


Pt evaluation today including:  conversation w/ patient, physical exam


Voiding:  no voiding problems, no incontinence


Bobby reports feeling ok today, he is hungry and eager to have some coffee 

again. He is having some gas. He denies any nausea or vomiting. He reports he 

had seven small BMs yesterday. He reports he had felt slightly unwell and 

thought he had the flu, which is why he went to see his PCP. He reports the two 

previous times he had SBO he had also started to feel like he had the flu.





   Constitutional:  No fever, No chills


   Eyes:  No worsening of vision


   ENT:  No hearing loss


   Respiratory:  No cough, No sputum


   Skin:  No rash


   All Other Systems:  Reviewed and Negative





Medications





Current Inpatient Medications








 Medications


  (Trade)  Dose


 Ordered  Sig/Romario


 Route  Start Time


 Stop Time Status Last Admin


Dose Admin


 


 Ioversol


  (Optiray 320)  100 ml  UD  PRN


 IV  3/5/18 17:15


 3/9/18 17:14   


 


 


 Ondansetron HCl


  (Zofran Inj)  4 mg  Q6H  PRN


 IV  3/5/18 20:30


 4/4/18 20:29   


 


 


 Piperacillin Sod/


 Tazobactam Sod


 3.375 gm/Dextrose  115 ml @ 


 28.75 mls/


 hr  Q8


 IV  3/6/18 00:00


 3/16/18 00:00  3/6/18 05:54


28.75 MLS/HR


 


 Miscellaneous


 Information


  (Consult)  1 ea  UD  PRN


 N/A  3/5/18 20:30


 4/4/18 20:29   


 


 


 Sodium Chloride  1,000 ml @ 


 125 mls/hr  Q8H


 IV  3/5/18 22:30


 4/4/18 22:29  3/6/18 05:54


125 MLS/HR


 


 Pantoprazole


 Sodium 40 mg/


 Syringe  10 ml @ 5


 mls/min  DAILY@11


 IV  3/6/18 11:00


 4/5/18 10:59   


 


 


 Acetaminophen


 1000 mg/Empty Bag  100 ml @ 


 400 mls/hr  Q8H


 IV  3/6/18 02:00


 4/5/18 01:59  3/6/18 02:00


400 MLS/HR


 


 Morphine Sulfate


  (MoRPHine


 SULFATE INJ)  2 mg  Q4H  PRN


 IV  3/5/18 21:15


 3/19/18 21:14   


 


 


 Ketorolac


 Tromethamine


  (Toradol Inj)  15 mg  Q4H


 IV.  3/5/18 22:30


 3/10/18 22:29  3/6/18 05:54


15 MG


 


 Potassium


 Chloride 20 meq/


 Prmx  100 ml @ 


 50 mls/hr  NOW  STAT


 IV  3/6/18 08:29


 3/6/18 10:28 UNV  


 


 


 Magnesium Sulfate


 1 gm/Prmx  100 ml @ 


 100 mls/hr  NOW  STAT


 IV  3/6/18 08:29


 3/6/18 09:28 UNV  


 











Objective


Vital Signs











  Date Time  Temp Pulse Resp B/P (MAP) Pulse Ox O2 Delivery O2 Flow Rate FiO2


 


3/6/18 07:22 36.7 64 17 114/67 (83) 94 Room Air  


 


3/5/18 23:40      Room Air  


 


3/5/18 22:55 36.9 88 18 123/77 (92) 94 Room Air  


 


3/5/18 22:20 36.8  16 141/81 96 Room Air  


 


3/5/18 21:59  84  128/80 96   


 


3/5/18 21:24  84  128/80 96 Room Air  


 


3/5/18 19:01  96 18 123/71 95 Room Air  


 


3/5/18 17:05  108      


 


3/5/18 16:57  109 14 132/87    


 


3/5/18 16:02 38.5 119 18 124/80 98 Room Air  











Physical Exam


General Appearance:  WD/WN, no apparent distress


Eyes:  normal inspection, PERRL


ENT:  normal ENT inspection, hearing grossly normal


Neck:  supple, no JVD


Respiratory/Chest:  lungs clear, normal breath sounds, no respiratory distress


Cardiovascular:  regular rate, rhythm, no murmur


Abdomen:  soft, + pertinent finding (soft BS but present)


Extremities:  non-tender, no pedal edema


Neurologic/Psychiatric:  alert, normal mood/affect, oriented x 3


Skin:  + pertinent finding (body art over torso and arms)





Laboratory Results





Last 24 Hours








Test


  3/5/18


17:31 3/5/18


17:40 3/5/18


18:31 3/5/18


20:01


 


White Blood Count 6.32 K/uL    


 


Red Blood Count 4.63 M/uL    


 


Hemoglobin 15.3 g/dL    


 


Hematocrit 43.3 %    


 


Mean Corpuscular Volume 93.5 fL    


 


Mean Corpuscular Hemoglobin 33.0 pg    


 


Mean Corpuscular Hemoglobin


Concent 35.3 g/dl 


  


  


  


 


 


Platelet Count 194 K/uL    


 


Mean Platelet Volume 9.9 fL    


 


Neutrophils (%) (Auto) 90.9 %    


 


Lymphocytes (%) (Auto) 2.8 %    


 


Monocytes (%) (Auto) 5.9 %    


 


Eosinophils (%) (Auto) 0.0 %    


 


Basophils (%) (Auto) 0.2 %    


 


Neutrophils # (Auto) 5.75 K/uL    


 


Lymphocytes # (Auto) 0.18 K/uL    


 


Monocytes # (Auto) 0.37 K/uL    


 


Eosinophils # (Auto) 0.00 K/uL    


 


Basophils # (Auto) 0.01 K/uL    


 


RDW Standard Deviation 43.0 fL    


 


RDW Coefficient of Variation 12.6 %    


 


Immature Granulocyte % (Auto) 0.2 %    


 


Immature Granulocyte # (Auto) 0.01 K/uL    


 


Erythrocyte Sedimentation Rate 8 mm/hr    


 


Sodium Level 132 mmol/L    


 


Potassium Level 3.9 mmol/L    


 


Chloride Level 97 mmol/L    


 


Carbon Dioxide Level 26 mmol/L    


 


Anion Gap 9.0 mmol/L    


 


Blood Urea Nitrogen 21 mg/dl    


 


Creatinine 1.35 mg/dl    


 


Est Creatinine Clear Calc


Drug Dose 66.6 ml/min 


  


  


  


 


 


Estimated GFR (


American) 66.1 


  


  


  


 


 


Estimated GFR (Non-


American 57.1 


  


  


  


 


 


BUN/Creatinine Ratio 15.4    


 


Random Glucose 143 mg/dl    


 


Calcium Level 9.0 mg/dl    


 


Total Bilirubin 1.1 mg/dl    


 


Direct Bilirubin 0.3 mg/dl    


 


Aspartate Amino Transf


(AST/SGOT) 44 U/L 


  


  


  


 


 


Alanine Aminotransferase


(ALT/SGPT) 65 U/L 


  


  


  


 


 


Alkaline Phosphatase 84 U/L    


 


C-Reactive Protein 1.85 mg/dl    


 


Total Protein 7.6 gm/dl    


 


Albumin 3.8 gm/dl    


 


Lipase 135 U/L    


 


Procalcitonin 0.60 ng/ml    


 


Influenza Type A Antigen


  


  Neg for Influ


A 


  


 


 


Influenza Type B Antigen


  


  Neg for Influ


B 


  


 


 


Urine Color   YELLOW  


 


Urine Appearance   CLEAR  


 


Urine pH   6.0  


 


Urine Specific Gravity   1.043  


 


Urine Protein   NEG  


 


Urine Glucose (UA)   NEG  


 


Urine Ketones   TRACE  


 


Urine Occult Blood   NEG  


 


Urine Nitrite   NEG  


 


Urine Bilirubin   NEG  


 


Urine Urobilinogen   NEG  


 


Urine Leukocyte Esterase   NEG  


 


Lactic Acid Level    0.8 mmol/L 


 


Test


  3/6/18


07:23 


  


  


 


 


White Blood Count 3.54 K/uL    


 


Red Blood Count 4.11 M/uL    


 


Hemoglobin 13.3 g/dL    


 


Hematocrit 38.3 %    


 


Mean Corpuscular Volume 93.2 fL    


 


Mean Corpuscular Hemoglobin 32.4 pg    


 


Mean Corpuscular Hemoglobin


Concent 34.7 g/dl 


  


  


  


 


 


Platelet Count 156 K/uL    


 


Mean Platelet Volume 9.7 fL    


 


Neutrophils (%) (Auto) 78.2 %    


 


Lymphocytes (%) (Auto) 14.7 %    


 


Monocytes (%) (Auto) 6.8 %    


 


Eosinophils (%) (Auto) 0.0 %    


 


Basophils (%) (Auto) 0.0 %    


 


Neutrophils # (Auto) 2.77 K/uL    


 


Lymphocytes # (Auto) 0.52 K/uL    


 


Monocytes # (Auto) 0.24 K/uL    


 


Eosinophils # (Auto) 0.00 K/uL    


 


Basophils # (Auto) 0.00 K/uL    


 


RDW Standard Deviation 43.1 fL    


 


RDW Coefficient of Variation 12.6 %    


 


Immature Granulocyte % (Auto) 0.3 %    


 


Immature Granulocyte # (Auto) 0.01 K/uL    


 


Sodium Level 135 mmol/L    


 


Potassium Level 3.4 mmol/L    


 


Chloride Level 103 mmol/L    


 


Carbon Dioxide Level 24 mmol/L    


 


Anion Gap 8.0 mmol/L    


 


Blood Urea Nitrogen 22 mg/dl    


 


Creatinine 1.52 mg/dl    


 


Est Creatinine Clear Calc


Drug Dose 59.1 ml/min 


  


  


  


 


 


Estimated GFR (


American) 57.3 


  


  


  


 


 


Estimated GFR (Non-


American 49.4 


  


  


  


 


 


BUN/Creatinine Ratio 14.5    


 


Random Glucose 110 mg/dl    


 


Calcium Level 8.1 mg/dl    


 


Magnesium Level 1.9 mg/dl    











Assessment and Plan


58 yo M, day 2 of small bowel obstruction being treated conservatively.





Small bowel obstruction 


- NPO with IV fluids 125cc/hour


- Has minimal pain but has PRN Tylenol, Toradol, Morphine as needed


- GI Consult 


- Holding home Aspirin, Lipitor, and Lorazepam while NPO





Potential infectious enteritis


- Zosyn IV q8h day 2


- Blood cultures pending


- Monitoring labs





Hx of GERD w/Barretts Esophagus


- Continue IV Protonix





VTE: SCDs


Code status: Full


Dispo: On Med/Surg, anticipate DC home in 1-2 days





Resident Physician Supervision Note:





I interviewed and examined the patient. Discussed with Dr. Patel and agree 

with findings and plan as documented in the note. Any exceptions or 

clarifications are listed here: None








This patient actually feels improved he says his abdomen is less distended and 

bloated he has had some mild flatus but no bowel movements


Vital signs show temperature is 36 9 pulse 64 respiration rate 17 /67


Of note his hemoglobin dropped 2 g this could be delusional from hydration 

there is no overt signs of acute GI bleeding but does have history of Ortiz's 

esophagitis





Physical exam shows abdomen to be with hypoactive bowel sounds tympanitic to 

percussion and mildly tender to palpation





This patient is here with a partial small bowel obstruction with a history of 

Ortiz's esophagitis and GERD he has had intermittent issues in the past with 

similar presentations GI medicine is seen him and is planning on performing a 

colonoscopy to determine if he has any inflammatory bowel changes in his 

terminal ileum or colon they have also begun giving him liquids by mouth and 

will give him a colon prep and obviously if he tolerates this he is not fully 

obstructed we will continue antibiotics preventatively until further etiologies 

are ruled out


Documented By:  Everette Hallman





Resident Tracking


Resident Involvement:  Resident Care Provided


Care Provided:  Adult Hospital Medicine

## 2018-03-06 NOTE — GASTROINTESTINAL CONSULTATION
Gastrointestinal Consultation


Date of Consultation:  Mar 6, 2018


Attending Physician:  Dr. Mcfarland


Consulting Physician:  Dr. Licea/PAM Foster


History of Present Illness


Patient is a 57 year old male with a history of GERD and Ortiz's esophagus as 

well as colon polyps and history of small bowel obstruction admitted with 

abdominal pain that began three days prior to arrival. At the onset, the 

patient states the abdominal pain as was associated with low grade fever, 

general malaise as well as myalgias and arthralgias. States "I thought I was 

getting the flu". Over the past three days, he has been having worsening 

abdominal pain and was evaluated by his PCP yesterday who referred him to the 

ER due to concern of abdominal distention. On arrival, the patient was 

hemodynamically stable with normal WBC count of 6.32. CT imaging was performed 

and significant for mid to distal partial small bowel obstruction with 

associated inflammatory change within the distal ileum and TI. Patient's 

lactate level was normal at 0.8 but CRP was elevated at 1.85. Currently, the 

patient rates his abdominal pain as 4/10 in the lower abdomen without radiation 

to his  back. Patient denies any nausea or vomiting or diarrhea. Passed several 

small caliber stools yesterday followed by several "normal" bowel movements for 

a total of 7 bms yesterday. No melena or hematochezia. Patient has been started 

on IV Zosyn and kept NPO. Last colonoscopy was performed in 2015 which 

demonstrated a polyp.





Past Medical/Surgical History


Medical Problems:


(1) Abdominal pain


Status: Acute  





(2) Corneal abrasion


Status: Acute  





(3) Dehydration


Status: Acute  





(4) Fever


Status: Acute  





(5) IBS (irritable bowel syndrome)


Status: Acute  





(6) Small bowel obstruction


Status: Acute  





(7) Work related injury


Status: Acute  








Past Medical History:


1. GERD


2. Ortiz's esophagus


3. Esophageal dyskinesia


4. Campylobacter diarrhea


5. Lumbar radiculopathy


6. Obstructive sleep apnea


7. Right biceps muscle tear


8. Colon polyps


Past Surgical History:


1. Appendectomy


2. Hernia repair


3. Back surgery


4. EGD


5. Colonoscopy





Family History


Sororal history of colon cancer. Negative for IBD.





Social History


Smoking Status:  Former Smoker


Alcohol Use:  occasionally


Marital Status:  


Occupation Status:  employed





Allergies


Coded Allergies:  


     Sulfa Antibiotics (Verified  Allergy, Intermediate, SEVERE HIVES/ITCHING, 3

/6/18)





Current Medications





Home Meds and Scripts








 Medications  Dose


 Route/Sig


 Max Daily Dose Days Date Category


 


 Probiotic


  (Probiotic


 Product) 1 Cap Cap  1 Cap


 PO DAILY


    3/5/18 Reported


 


 Prevacid


  (Lansoprazole) 30


 Mg Capcr  30 Mg


 PO BID


    3/5/18 Reported


 


 Lipitor


  (Atorvastatin


 Calcium) 20 Mg Tab  20 Mg


 PO HS


    1/24/17 Reported


 


 Fiber Laxative


  (Fiber)  Ea  1 Tab


 PO QAM


    12/8/16 Reported


 


 Vitamin D


  (Cholecalciferol)


 2,000 Unit Cap  2,000 Inter.unit


 PO QAM


    6/9/15 Reported


 


 Aspirin Ec


  (Aspirin) 81 Mg


 Tab  81 Mg


 PO HS


    6/9/15 Reported


 


 Lorazepam 0.5 Mg


 Tab  0.5 Mg


 PO DAILY PRN


    6/9/15 Reported











Review of Systems


Constitutional:  + see HPI


Eyes:  No eye pain, No redness


ENT:  No problem reported


Respiratory:  No problem reported


Cardiac:  No problem reported


Abdomen:  + see HPI


Musculoskeletal:  + see HPI


Male :  No problem reported


Neuro:  No problem reported


Psych:  No problem reported


Skin:  No problem reported





Physical Exam











  Date Time  Temp Pulse Resp B/P (MAP) Pulse Ox O2 Delivery O2 Flow Rate FiO2


 


3/6/18 07:22 36.7 64 17 114/67 (83) 94 Room Air  


 


3/5/18 23:40      Room Air  


 


3/5/18 22:55 36.9 88 18 123/77 (92) 94 Room Air  


 


3/5/18 22:20 36.8  16 141/81 96 Room Air  


 


3/5/18 21:59  84  128/80 96   


 


3/5/18 21:24  84  128/80 96 Room Air  


 


3/5/18 19:01  96 18 123/71 95 Room Air  


 


3/5/18 17:05  108      


 


3/5/18 16:57  109 14 132/87    


 


3/5/18 16:02 38.5 119 18 124/80 98 Room Air  








General Appearance:  no apparent distress


Eyes:  EOMI


ENT:  hearing grossly normal


Neck:  supple


Respiratory/Chest:  lungs clear, normal breath sounds, no respiratory distress


Cardiovascular:  regular rate, rhythm, no gallop, no murmur


Abdomen:  normal bowel sounds, soft, + distended, + tenderness (diffusely)


Extremities:  no pedal edema


Neurologic/Psych:  alert, normal mood/affect, oriented x 3


Skin:  warm/dry





Laboratory Results





Last 24 Hours








Test


  3/5/18


17:31 3/5/18


17:40 3/5/18


18:31 3/5/18


20:01


 


White Blood Count 6.32 K/uL    


 


Red Blood Count 4.63 M/uL    


 


Hemoglobin 15.3 g/dL    


 


Hematocrit 43.3 %    


 


Mean Corpuscular Volume 93.5 fL    


 


Mean Corpuscular Hemoglobin 33.0 pg    


 


Mean Corpuscular Hemoglobin


Concent 35.3 g/dl 


  


  


  


 


 


Platelet Count 194 K/uL    


 


Mean Platelet Volume 9.9 fL    


 


Neutrophils (%) (Auto) 90.9 %    


 


Lymphocytes (%) (Auto) 2.8 %    


 


Monocytes (%) (Auto) 5.9 %    


 


Eosinophils (%) (Auto) 0.0 %    


 


Basophils (%) (Auto) 0.2 %    


 


Neutrophils # (Auto) 5.75 K/uL    


 


Lymphocytes # (Auto) 0.18 K/uL    


 


Monocytes # (Auto) 0.37 K/uL    


 


Eosinophils # (Auto) 0.00 K/uL    


 


Basophils # (Auto) 0.01 K/uL    


 


RDW Standard Deviation 43.0 fL    


 


RDW Coefficient of Variation 12.6 %    


 


Immature Granulocyte % (Auto) 0.2 %    


 


Immature Granulocyte # (Auto) 0.01 K/uL    


 


Erythrocyte Sedimentation Rate 8 mm/hr    


 


Sodium Level 132 mmol/L    


 


Potassium Level 3.9 mmol/L    


 


Chloride Level 97 mmol/L    


 


Carbon Dioxide Level 26 mmol/L    


 


Anion Gap 9.0 mmol/L    


 


Blood Urea Nitrogen 21 mg/dl    


 


Creatinine 1.35 mg/dl    


 


Est Creatinine Clear Calc


Drug Dose 66.6 ml/min 


  


  


  


 


 


Estimated GFR (


American) 66.1 


  


  


  


 


 


Estimated GFR (Non-


American 57.1 


  


  


  


 


 


BUN/Creatinine Ratio 15.4    


 


Random Glucose 143 mg/dl    


 


Calcium Level 9.0 mg/dl    


 


Total Bilirubin 1.1 mg/dl    


 


Direct Bilirubin 0.3 mg/dl    


 


Aspartate Amino Transf


(AST/SGOT) 44 U/L 


  


  


  


 


 


Alanine Aminotransferase


(ALT/SGPT) 65 U/L 


  


  


  


 


 


Alkaline Phosphatase 84 U/L    


 


C-Reactive Protein 1.85 mg/dl    


 


Total Protein 7.6 gm/dl    


 


Albumin 3.8 gm/dl    


 


Lipase 135 U/L    


 


Procalcitonin 0.60 ng/ml    


 


Influenza Type A Antigen


  


  Neg for Influ


A 


  


 


 


Influenza Type B Antigen


  


  Neg for Influ


B 


  


 


 


Urine Color   YELLOW  


 


Urine Appearance   CLEAR  


 


Urine pH   6.0  


 


Urine Specific Gravity   1.043  


 


Urine Protein   NEG  


 


Urine Glucose (UA)   NEG  


 


Urine Ketones   TRACE  


 


Urine Occult Blood   NEG  


 


Urine Nitrite   NEG  


 


Urine Bilirubin   NEG  


 


Urine Urobilinogen   NEG  


 


Urine Leukocyte Esterase   NEG  


 


Lactic Acid Level    0.8 mmol/L 


 


Test


  3/6/18


07:23 


  


  


 


 


White Blood Count 3.54 K/uL    


 


Red Blood Count 4.11 M/uL    


 


Hemoglobin 13.3 g/dL    


 


Hematocrit 38.3 %    


 


Mean Corpuscular Volume 93.2 fL    


 


Mean Corpuscular Hemoglobin 32.4 pg    


 


Mean Corpuscular Hemoglobin


Concent 34.7 g/dl 


  


  


  


 


 


Platelet Count 156 K/uL    


 


Mean Platelet Volume 9.7 fL    


 


Neutrophils (%) (Auto) 78.2 %    


 


Lymphocytes (%) (Auto) 14.7 %    


 


Monocytes (%) (Auto) 6.8 %    


 


Eosinophils (%) (Auto) 0.0 %    


 


Basophils (%) (Auto) 0.0 %    


 


Neutrophils # (Auto) 2.77 K/uL    


 


Lymphocytes # (Auto) 0.52 K/uL    


 


Monocytes # (Auto) 0.24 K/uL    


 


Eosinophils # (Auto) 0.00 K/uL    


 


Basophils # (Auto) 0.00 K/uL    


 


RDW Standard Deviation 43.1 fL    


 


RDW Coefficient of Variation 12.6 %    


 


Immature Granulocyte % (Auto) 0.3 %    


 


Immature Granulocyte # (Auto) 0.01 K/uL    


 


Sodium Level 135 mmol/L    


 


Potassium Level 3.4 mmol/L    


 


Chloride Level 103 mmol/L    


 


Carbon Dioxide Level 24 mmol/L    


 


Anion Gap 8.0 mmol/L    


 


Blood Urea Nitrogen 22 mg/dl    


 


Creatinine 1.52 mg/dl    


 


Est Creatinine Clear Calc


Drug Dose 59.1 ml/min 


  


  


  


 


 


Estimated GFR (


American) 57.3 


  


  


  


 


 


Estimated GFR (Non-


American 49.4 


  


  


  


 


 


BUN/Creatinine Ratio 14.5    


 


Random Glucose 110 mg/dl    


 


Calcium Level 8.1 mg/dl    


 


Magnesium Level 1.9 mg/dl    











Impression


Patient is a 57 year old male with history of small bowel obstruction admitted 

with abdominal pain and distention as well as elevated CRP and abnormal CT 

imaging suggestive of PSBO and possible ileitis.





Diff dx: Crohn's vs infection (on abx) vs mass vs false positive CT in the 

absence of oral enhancement vs other.





Plan


1. Advance diet to clear liquids.


2. GoLytely bowel prep this evening.


3. Diagnostic colonoscopy with Dr. Licea tomorrow for further evaluation.


4. Continue IV Zosyn as ordered.


5. Supportive medical management per primary team.


6. Additional recommendations will be made pending results of testing.





Thank you for allowing us to participate in the care of this pleasant patient. 

If you have any questions or concerns, please do not hesitate to contact us.





Agree with PAM Foster as prescribed


Abd:  Soft, NT, ND, +BS


Continue current therapy and supportive care


Colonoscopy tomorrow

## 2018-03-07 VITALS
DIASTOLIC BLOOD PRESSURE: 86 MMHG | SYSTOLIC BLOOD PRESSURE: 148 MMHG | OXYGEN SATURATION: 99 % | TEMPERATURE: 97.88 F | HEART RATE: 60 BPM

## 2018-03-07 VITALS
SYSTOLIC BLOOD PRESSURE: 155 MMHG | TEMPERATURE: 98.24 F | HEART RATE: 52 BPM | OXYGEN SATURATION: 96 % | DIASTOLIC BLOOD PRESSURE: 90 MMHG

## 2018-03-07 VITALS
TEMPERATURE: 97.88 F | HEART RATE: 51 BPM | SYSTOLIC BLOOD PRESSURE: 166 MMHG | OXYGEN SATURATION: 96 % | DIASTOLIC BLOOD PRESSURE: 85 MMHG

## 2018-03-07 VITALS — OXYGEN SATURATION: 99 %

## 2018-03-07 VITALS — OXYGEN SATURATION: 98 %

## 2018-03-07 VITALS
DIASTOLIC BLOOD PRESSURE: 79 MMHG | OXYGEN SATURATION: 96 % | HEART RATE: 49 BPM | SYSTOLIC BLOOD PRESSURE: 143 MMHG | TEMPERATURE: 98.06 F

## 2018-03-07 LAB
BASOPHILS # BLD: 0.01 K/UL (ref 0–0.2)
BASOPHILS NFR BLD: 0.4 %
BUN SERPL-MCNC: 11 MG/DL (ref 7–18)
CALCIUM SERPL-MCNC: 8.6 MG/DL (ref 8.5–10.1)
CO2 SERPL-SCNC: 30 MMOL/L (ref 21–32)
CREAT SERPL-MCNC: 1.16 MG/DL (ref 0.6–1.4)
EOS ABS #: 0.05 K/UL (ref 0–0.5)
EOSINOPHIL NFR BLD AUTO: 157 K/UL (ref 130–400)
GLUCOSE SERPL-MCNC: 103 MG/DL (ref 70–99)
HCT VFR BLD CALC: 36.2 % (ref 42–52)
HGB BLD-MCNC: 12.5 G/DL (ref 14–18)
IG#: 0 K/UL (ref 0–0.02)
IMM GRANULOCYTES NFR BLD AUTO: 26.8 %
LYMPHOCYTES # BLD: 0.63 K/UL (ref 1.2–3.4)
MCH RBC QN AUTO: 32.3 PG (ref 25–34)
MCHC RBC AUTO-ENTMCNC: 34.5 G/DL (ref 32–36)
MCV RBC AUTO: 93.5 FL (ref 80–100)
MONO ABS #: 0.26 K/UL (ref 0.11–0.59)
MONOCYTES NFR BLD: 11.1 %
NEUT ABS #: 1.4 K/UL (ref 1.4–6.5)
NEUTROPHILS # BLD AUTO: 2.1 %
NEUTROPHILS NFR BLD AUTO: 59.6 %
PMV BLD AUTO: 9.4 FL (ref 7.4–10.4)
POTASSIUM SERPL-SCNC: 3.7 MMOL/L (ref 3.5–5.1)
RED CELL DISTRIBUTION WIDTH CV: 12.6 % (ref 11.5–14.5)
RED CELL DISTRIBUTION WIDTH SD: 42.2 FL (ref 36.4–46.3)
SODIUM SERPL-SCNC: 141 MMOL/L (ref 136–145)
WBC # BLD AUTO: 2.35 K/UL (ref 4.8–10.8)

## 2018-03-07 PROCEDURE — 0DJD8ZZ INSPECTION OF LOWER INTESTINAL TRACT, VIA NATURAL OR ARTIFICIAL OPENING ENDOSCOPIC: ICD-10-PCS | Performed by: INTERNAL MEDICINE

## 2018-03-07 RX ADMIN — SODIUM CHLORIDE SCH MLS/HR: 900 INJECTION, SOLUTION INTRAVENOUS at 20:58

## 2018-03-07 RX ADMIN — KETOROLAC TROMETHAMINE SCH MG: 15 INJECTION INTRAMUSCULAR; INTRAVENOUS at 18:54

## 2018-03-07 RX ADMIN — PANTOPRAZOLE SODIUM SCH MLS/MIN: 40 INJECTION, POWDER, FOR SOLUTION INTRAVENOUS at 10:55

## 2018-03-07 RX ADMIN — KETOROLAC TROMETHAMINE SCH MG: 15 INJECTION INTRAMUSCULAR; INTRAVENOUS at 14:35

## 2018-03-07 RX ADMIN — PIPERACILLIN AND TAZOBACTAM SCH MLS/HR: 3; .375 INJECTION, POWDER, LYOPHILIZED, FOR SOLUTION INTRAVENOUS; PARENTERAL at 13:54

## 2018-03-07 RX ADMIN — SODIUM CHLORIDE SCH MLS/HR: 900 INJECTION, SOLUTION INTRAVENOUS at 10:51

## 2018-03-07 RX ADMIN — ACETAMINOPHEN SCH MLS/HR: 10 INJECTION, SOLUTION INTRAVENOUS at 18:54

## 2018-03-07 RX ADMIN — PIPERACILLIN AND TAZOBACTAM SCH MLS/HR: 3; .375 INJECTION, POWDER, LYOPHILIZED, FOR SOLUTION INTRAVENOUS; PARENTERAL at 22:16

## 2018-03-07 RX ADMIN — ACETAMINOPHEN SCH MLS/HR: 10 INJECTION, SOLUTION INTRAVENOUS at 03:19

## 2018-03-07 RX ADMIN — KETOROLAC TROMETHAMINE SCH MG: 15 INJECTION INTRAMUSCULAR; INTRAVENOUS at 23:28

## 2018-03-07 RX ADMIN — SODIUM CHLORIDE SCH MLS/HR: 900 INJECTION, SOLUTION INTRAVENOUS at 15:30

## 2018-03-07 RX ADMIN — ACETAMINOPHEN SCH MLS/HR: 10 INJECTION, SOLUTION INTRAVENOUS at 10:56

## 2018-03-07 RX ADMIN — KETOROLAC TROMETHAMINE SCH MG: 15 INJECTION INTRAMUSCULAR; INTRAVENOUS at 10:55

## 2018-03-07 RX ADMIN — KETOROLAC TROMETHAMINE SCH MG: 15 INJECTION INTRAMUSCULAR; INTRAVENOUS at 05:27

## 2018-03-07 RX ADMIN — SODIUM CHLORIDE SCH MLS/HR: 900 INJECTION, SOLUTION INTRAVENOUS at 23:28

## 2018-03-07 RX ADMIN — PIPERACILLIN AND TAZOBACTAM SCH MLS/HR: 3; .375 INJECTION, POWDER, LYOPHILIZED, FOR SOLUTION INTRAVENOUS; PARENTERAL at 05:25

## 2018-03-07 RX ADMIN — KETOROLAC TROMETHAMINE SCH MG: 15 INJECTION INTRAMUSCULAR; INTRAVENOUS at 03:18

## 2018-03-07 NOTE — FAMILY MEDICINE PROGRESS NOTE
Progress Note


Date of Service


Mar 7, 2018.





Subjective


Pt evaluation today including:  conversation w/ patient, physical exam


Had Go-Lytely prep, passed a large explosive bowel movement, then lots of 

watery stool, which is continuing. Feels gassy. Denies any worsening abdominal 

pain. Eager to go home by tmw because of tattoo appt on Friday. 





Saw pt again after colonoscopy. Discussed results w/Dr Case, colonoscopy was 

normal, mild diverticulosis. Top ddx is a viral enteritis.





   Constitutional:  No fever, No chills, No sweats, No weakness


   ENT:  No hearing loss


   Respiratory:  No cough, No sputum


   Abdomen:  No nausea, No constipation


   All Other Systems:  Reviewed and Negative





Medications





Current Inpatient Medications








 Medications


  (Trade)  Dose


 Ordered  Sig/Romario


 Route  Start Time


 Stop Time Status Last Admin


Dose Admin


 


 Ioversol


  (Optiray 320)  100 ml  UD  PRN


 IV  3/5/18 17:15


 3/9/18 17:14   


 


 


 Ondansetron HCl


  (Zofran Inj)  4 mg  Q6H  PRN


 IV  3/5/18 20:30


 4/4/18 20:29   


 


 


 Piperacillin Sod/


 Tazobactam Sod


 3.375 gm/Dextrose  115 ml @ 


 28.75 mls/


 hr  Q8


 IV  3/6/18 00:00


 3/16/18 00:00  3/7/18 05:25


28.75 MLS/HR


 


 Miscellaneous


 Information


  (Consult)  1 ea  UD  PRN


 N/A  3/5/18 20:30


 4/4/18 20:29   


 


 


 Sodium Chloride  1,000 ml @ 


 125 mls/hr  Q8H


 IV  3/5/18 22:30


 4/4/18 22:29  3/6/18 23:30


125 MLS/HR


 


 Pantoprazole


 Sodium 40 mg/


 Syringe  10 ml @ 5


 mls/min  DAILY@11


 IV  3/6/18 11:00


 4/5/18 10:59  3/6/18 11:06


5 MLS/MIN


 


 Acetaminophen


 1000 mg/Empty Bag  100 ml @ 


 400 mls/hr  Q8H


 IV  3/6/18 02:00


 4/5/18 01:59  3/7/18 03:19


400 MLS/HR


 


 Morphine Sulfate


  (MoRPHine


 SULFATE INJ)  2 mg  Q4H  PRN


 IV  3/5/18 21:15


 3/19/18 21:14  3/6/18 13:50


2 MG


 


 Ketorolac


 Tromethamine


  (Toradol Inj)  15 mg  Q4H


 IV.  3/5/18 22:30


 3/10/18 22:29  3/7/18 05:27


15 MG


 


 Polyethylene


 Glycol/


 Electrolytes


  (Golytely Soln)  16 dose  TODAY@1800


 PO  3/6/18 18:00


 3/7/18 12:00  3/6/18 19:02


16 DOSE











Objective


Vital Signs











  Date Time  Temp Pulse Resp B/P (MAP) Pulse Ox O2 Delivery O2 Flow Rate FiO2


 


3/7/18 08:01     99 Room Air  


 


3/7/18 07:54 36.6 60 18 148/86 (106) 99 Room Air  


 


3/6/18 23:25      Room Air  


 


3/6/18 23:10 36.4 56 18 157/89 (111) 98 Room Air  


 


3/6/18 15:24 36.7 59 18 155/62 (93) 96 Room Air  


 


3/6/18 15:15      Room Air  











Physical Exam


General Appearance:  WD/WN, no apparent distress


Eyes:  PERRL


ENT:  hearing grossly normal


Neck:  supple, no JVD


Respiratory/Chest:  lungs clear, normal breath sounds


Cardiovascular:  regular rate, rhythm, no murmur


Abdomen:  soft, + distended


Extremities:  non-tender, no pedal edema


Neurologic/Psychiatric:  alert, normal mood/affect, oriented x 3


Skin:  no rash





Laboratory Results





Last 24 Hours








Test


  3/7/18


07:33


 


White Blood Count 2.35 K/uL 


 


Red Blood Count 3.87 M/uL 


 


Hemoglobin 12.5 g/dL 


 


Hematocrit 36.2 % 


 


Mean Corpuscular Volume 93.5 fL 


 


Mean Corpuscular Hemoglobin 32.3 pg 


 


Mean Corpuscular Hemoglobin


Concent 34.5 g/dl 


 


 


Platelet Count 157 K/uL 


 


Mean Platelet Volume 9.4 fL 


 


Neutrophils (%) (Auto) 59.6 % 


 


Lymphocytes (%) (Auto) 26.8 % 


 


Monocytes (%) (Auto) 11.1 % 


 


Eosinophils (%) (Auto) 2.1 % 


 


Basophils (%) (Auto) 0.4 % 


 


Neutrophils # (Auto) 1.40 K/uL 


 


Lymphocytes # (Auto) 0.63 K/uL 


 


Monocytes # (Auto) 0.26 K/uL 


 


Eosinophils # (Auto) 0.05 K/uL 


 


Basophils # (Auto) 0.01 K/uL 


 


RDW Standard Deviation 42.2 fL 


 


RDW Coefficient of Variation 12.6 % 


 


Immature Granulocyte % (Auto) 0.0 % 


 


Immature Granulocyte # (Auto) 0.00 K/uL 


 


Sodium Level 141 mmol/L 


 


Potassium Level 3.7 mmol/L 


 


Chloride Level 107 mmol/L 


 


Carbon Dioxide Level 30 mmol/L 


 


Anion Gap 4.0 mmol/L 


 


Blood Urea Nitrogen 11 mg/dl 


 


Creatinine 1.16 mg/dl 


 


Est Creatinine Clear Calc


Drug Dose 77.5 ml/min 


 


 


Estimated GFR (


American) 79.4 


 


 


Estimated GFR (Non-


American 68.5 


 


 


BUN/Creatinine Ratio 9.4 


 


Random Glucose 103 mg/dl 


 


Calcium Level 8.6 mg/dl 











Assessment and Plan


58 yo M, had increased abdominal distention / discomfort, found to have 

diverticulosis on colonoscopy





Abdominal distention


- Unclear if this was true SBO - could have been false positive on CT scan due 

to no PO contrast


- Will slowly advance diet to full liquids this PM and continue advancing as 

tolerated


- Per Dr Case, next colonoscopy in 5 years





Potential infectious enteritis


- Zosyn IV q8h 


- Blood cultures so far negative


- Monitoring labs





Hx of GERD w/Barretts Esophagus


- Continue IV Protonix





VTE: SCDs


Code status: Full


Dispo: On Med/Surg, anticipate DC home tomorrow





Resident Physician Supervision Note:





I interviewed and examined the patient. Discussed with Dr. Patel and agree 

with findings and plan as documented in the note. Any exceptions or 

clarifications are listed here: None








This patient had a relatively unremarkable colonoscopy only with diverticulosis 

seen there is question that the initial CT scan may have been difficult to 

interpret due to lack of oral contrast gastroenterology believed to be can 

advance his diet will start with full liquids and advance as tolerated if he 

does improve he may build to go home with consideration for capsule enteroscopy 

in the future as he has had recurrence of his enteritis over the last few years





Temperature 36 6 pulse 60 respiration 18 /86 heart exam is regular lungs 

are clear abdomen slightly distended it is dull to percussion soft and nontender





Advancing diet if and does tolerate diet may be discharged in the next 1-2 days


Documented By:  Everette Hallman





Resident Tracking


Resident Involvement:  Resident Care Provided


Care Provided:  Adult Hospital Medicine

## 2018-03-07 NOTE — GASTROENTEROLOGY PROGRESS NOTE
Progress Note


Date of Service:  Mar 7, 2018


Subjective


Pt evaluation today including:  conversation w/ patient, physical exam, chart 

review, lab review, review of studies


Feeling better today.  No overt GI bleeding.  Tolerated clears and bowel prep.





Medications





Current Inpatient Medications








 Medications


  (Trade)  Dose


 Ordered  Sig/Romario


 Route  Start Time


 Stop Time Status Last Admin


Dose Admin


 


 Ioversol


  (Optiray 320)  100 ml  UD  PRN


 IV  3/5/18 17:15


 3/9/18 17:14   


 


 


 Ondansetron HCl


  (Zofran Inj)  4 mg  Q6H  PRN


 IV  3/5/18 20:30


 4/4/18 20:29   


 


 


 Piperacillin Sod/


 Tazobactam Sod


 3.375 gm/Dextrose  115 ml @ 


 28.75 mls/


 hr  Q8


 IV  3/6/18 00:00


 3/16/18 00:00  3/7/18 05:25


28.75 MLS/HR


 


 Miscellaneous


 Information


  (Consult)  1 ea  UD  PRN


 N/A  3/5/18 20:30


 4/4/18 20:29   


 


 


 Sodium Chloride  1,000 ml @ 


 125 mls/hr  Q8H


 IV  3/5/18 22:30


 4/4/18 22:29  3/7/18 10:51


125 MLS/HR


 


 Pantoprazole


 Sodium 40 mg/


 Syringe  10 ml @ 5


 mls/min  DAILY@11


 IV  3/6/18 11:00


 4/5/18 10:59  3/7/18 10:55


5 MLS/MIN


 


 Acetaminophen


 1000 mg/Empty Bag  100 ml @ 


 400 mls/hr  Q8H


 IV  3/6/18 02:00


 4/5/18 01:59  3/7/18 10:56


400 MLS/HR


 


 Morphine Sulfate


  (MoRPHine


 SULFATE INJ)  2 mg  Q4H  PRN


 IV  3/5/18 21:15


 3/19/18 21:14  3/6/18 13:50


2 MG


 


 Ketorolac


 Tromethamine


  (Toradol Inj)  15 mg  Q4H


 IV.  3/5/18 22:30


 3/10/18 22:29  3/7/18 10:55


15 MG


 


 Polyethylene


 Glycol/


 Electrolytes


  (Golytely Soln)  16 dose  TODAY@1800


 PO  3/6/18 18:00


 3/7/18 12:00  3/6/18 19:02


16 DOSE











Objective


Vital Signs











  Date Time  Temp Pulse Resp B/P (MAP) Pulse Ox O2 Delivery O2 Flow Rate FiO2


 


3/7/18 08:01     99 Room Air  


 


3/7/18 07:54 36.6 60 18 148/86 (106) 99 Room Air  


 


3/7/18 07:45     98 Room Air  


 


3/6/18 23:25      Room Air  


 


3/6/18 23:10 36.4 56 18 157/89 (111) 98 Room Air  


 


3/6/18 15:24 36.7 59 18 155/62 (93) 96 Room Air  


 


3/6/18 15:15      Room Air  











Physical Exam


General Appearance:  WD/WN, no apparent distress


Respiratory/Chest:  lungs clear


Cardiovascular:  regular rate, rhythm


Abdomen:  normal bowel sounds, non tender, soft





Laboratory Results





Last 24 Hours








Test


  3/7/18


07:33


 


White Blood Count 2.35 K/uL 


 


Red Blood Count 3.87 M/uL 


 


Hemoglobin 12.5 g/dL 


 


Hematocrit 36.2 % 


 


Mean Corpuscular Volume 93.5 fL 


 


Mean Corpuscular Hemoglobin 32.3 pg 


 


Mean Corpuscular Hemoglobin


Concent 34.5 g/dl 


 


 


Platelet Count 157 K/uL 


 


Mean Platelet Volume 9.4 fL 


 


Neutrophils (%) (Auto) 59.6 % 


 


Lymphocytes (%) (Auto) 26.8 % 


 


Monocytes (%) (Auto) 11.1 % 


 


Eosinophils (%) (Auto) 2.1 % 


 


Basophils (%) (Auto) 0.4 % 


 


Neutrophils # (Auto) 1.40 K/uL 


 


Lymphocytes # (Auto) 0.63 K/uL 


 


Monocytes # (Auto) 0.26 K/uL 


 


Eosinophils # (Auto) 0.05 K/uL 


 


Basophils # (Auto) 0.01 K/uL 


 


RDW Standard Deviation 42.2 fL 


 


RDW Coefficient of Variation 12.6 % 


 


Immature Granulocyte % (Auto) 0.0 % 


 


Immature Granulocyte # (Auto) 0.00 K/uL 


 


Sodium Level 141 mmol/L 


 


Potassium Level 3.7 mmol/L 


 


Chloride Level 107 mmol/L 


 


Carbon Dioxide Level 30 mmol/L 


 


Anion Gap 4.0 mmol/L 


 


Blood Urea Nitrogen 11 mg/dl 


 


Creatinine 1.16 mg/dl 


 


Est Creatinine Clear Calc


Drug Dose 77.5 ml/min 


 


 


Estimated GFR (


American) 79.4 


 


 


Estimated GFR (Non-


American 68.5 


 


 


BUN/Creatinine Ratio 9.4 


 


Random Glucose 103 mg/dl 


 


Calcium Level 8.6 mg/dl 











Assessment and Plan


Assessment:


56 yo CM who presented with Abdominal pain and distention and had abnormal CT 

scan of the abdomen.








Plan:


Proceed with colonoscopy

## 2018-03-07 NOTE — ANESTHESIOLOGY PROGRESS NOTE
Anesthesia Post Op Note


Date & Time


Mar 7, 2018 at 13:58





Vital Signs


Pain Intensity:  3.0





Vital Signs Past 12 Hours








  Date Time  Temp Pulse Resp B/P (MAP) Pulse Ox O2 Delivery O2 Flow Rate FiO2


 


3/7/18 13:00  50 18 161/97 (118) 100 Room Air  


 


3/7/18 12:45  54 18 169/91 (117) 99 Room Air  





  54      


 


3/7/18 12:30  54 12 171/91 (117) 99 Room Air  





  54      


 


3/7/18 11:45 36.9 52 18 171/91 (117) 98 Room Air  


 


3/7/18 08:01     99 Room Air  


 


3/7/18 07:54 36.6 60 18 148/86 (106) 99 Room Air  


 


3/7/18 07:45     98 Room Air  











Notes


Mental Status:  alert / awake / arousable, participated in evaluation


Pt Amnestic to Procedure:  Yes


Nausea / Vomiting:  adequately controlled


Pain:  adequately controlled


Airway Patency, RR, SpO2:  stable & adequate


BP & HR:  stable & adequate


Hydration State:  stable & adequate


Anesthetic Complications:  no major complications apparent

## 2018-03-07 NOTE — GI REPORT
Procedure Date: 3/7/2018 12:03 PM

Procedure:            Colonoscopy

Indications:          Abnormal CT of the GI tract

Medicines:            Monitored Anesthesia Care

Complications:        No immediate complications.

Estimated Blood Loss: Estimated blood loss: none.

Procedure:            Pre-Anesthesia Assessment:

                      - Prior to the procedure, a History and Physical was 

                      performed, and patient medications and allergies were 

                      reviewed. The patient's tolerance of previous 

                      anesthesia was also reviewed. The risks and benefits of 

                      the procedure and the sedation options and risks were 

                      discussed with the patient. All questions were 

                      answered, and informed consent was obtained. Prior 

                      Anticoagulants: The patient has taken aspirin, last 

                      dose was 3 days prior to procedure. ASA Grade 

                      Assessment: III - A patient with severe systemic 

                      disease. After reviewing the risks and benefits, the 

                      patient was deemed in satisfactory condition to undergo 

                      the procedure.

                      After I obtained informed consent, the scope was passed 

                      under direct vision. Throughout the procedure, the 

                      patient's blood pressure, pulse, and oxygen saturations 

                      were monitored continuously. The scope was introduced 

                      through the anus and advanced to 20 cm into the ileum. 

                      The colonoscopy was performed without difficulty. The 

                      patient tolerated the procedure well. The quality of 

                      the bowel preparation was good. The terminal ileum, 

                      ileocecal valve, appendiceal orifice, and rectum were 

                      photographed.

Findings:

     The perianal and digital rectal examinations were normal.

     Multiple small-mouthed diverticula were found in the sigmoid colon.

Impression:           - Diverticulosis in the sigmoid colon.

                      - No specimens collected.

Recommendation:       - [facility].

                      - Resume previous diet.

                      - Continue present medications.

                      - Repeat colonoscopy in 5 years for surveillance.

                      - Return to primary care physician at appointment to be 

                      scheduled.

Kayden Licea, 

3/7/2018 12:30:32 PM

This report has been signed electronically.

Note Initiated On: 3/7/2018 12:03 PM

     I attest to the content of the Intraoperative Record and orders 

     documented therein, exceptions below

## 2018-03-08 VITALS
DIASTOLIC BLOOD PRESSURE: 90 MMHG | TEMPERATURE: 97.88 F | OXYGEN SATURATION: 96 % | HEART RATE: 55 BPM | SYSTOLIC BLOOD PRESSURE: 172 MMHG

## 2018-03-08 VITALS
HEART RATE: 55 BPM | SYSTOLIC BLOOD PRESSURE: 172 MMHG | OXYGEN SATURATION: 96 % | TEMPERATURE: 97.88 F | DIASTOLIC BLOOD PRESSURE: 90 MMHG

## 2018-03-08 LAB
ALBUMIN SERPL-MCNC: 3 GM/DL (ref 3.4–5)
ALP SERPL-CCNC: 96 U/L (ref 45–117)
ALT SERPL-CCNC: 69 U/L (ref 12–78)
AST SERPL-CCNC: 32 U/L (ref 15–37)
BASOPHILS # BLD: 0.01 K/UL (ref 0–0.2)
BASOPHILS NFR BLD: 0.2 %
BUN SERPL-MCNC: 8 MG/DL (ref 7–18)
CALCIUM SERPL-MCNC: 8.4 MG/DL (ref 8.5–10.1)
CO2 SERPL-SCNC: 29 MMOL/L (ref 21–32)
CREAT SERPL-MCNC: 1.12 MG/DL (ref 0.6–1.4)
EOS ABS #: 0.12 K/UL (ref 0–0.5)
EOSINOPHIL NFR BLD AUTO: 164 K/UL (ref 130–400)
GLUCOSE SERPL-MCNC: 103 MG/DL (ref 70–99)
HCT VFR BLD CALC: 36.2 % (ref 42–52)
HGB BLD-MCNC: 12.6 G/DL (ref 14–18)
IG#: 0.01 K/UL (ref 0–0.02)
IMM GRANULOCYTES NFR BLD AUTO: 16.5 %
LYMPHOCYTES # BLD: 1.08 K/UL (ref 1.2–3.4)
MCH RBC QN AUTO: 32.6 PG (ref 25–34)
MCHC RBC AUTO-ENTMCNC: 34.8 G/DL (ref 32–36)
MCV RBC AUTO: 93.5 FL (ref 80–100)
MONO ABS #: 0.47 K/UL (ref 0.11–0.59)
MONOCYTES NFR BLD: 7.2 %
NEUT ABS #: 4.87 K/UL (ref 1.4–6.5)
NEUTROPHILS # BLD AUTO: 1.8 %
NEUTROPHILS NFR BLD AUTO: 74.1 %
PMV BLD AUTO: 9.8 FL (ref 7.4–10.4)
POTASSIUM SERPL-SCNC: 3.9 MMOL/L (ref 3.5–5.1)
PROT SERPL-MCNC: 6.3 GM/DL (ref 6.4–8.2)
RED CELL DISTRIBUTION WIDTH CV: 12.5 % (ref 11.5–14.5)
RED CELL DISTRIBUTION WIDTH SD: 42.2 FL (ref 36.4–46.3)
SODIUM SERPL-SCNC: 139 MMOL/L (ref 136–145)
WBC # BLD AUTO: 6.56 K/UL (ref 4.8–10.8)

## 2018-03-08 RX ADMIN — KETOROLAC TROMETHAMINE SCH MG: 15 INJECTION INTRAMUSCULAR; INTRAVENOUS at 03:10

## 2018-03-08 RX ADMIN — PIPERACILLIN AND TAZOBACTAM SCH MLS/HR: 3; .375 INJECTION, POWDER, LYOPHILIZED, FOR SOLUTION INTRAVENOUS; PARENTERAL at 06:16

## 2018-03-08 RX ADMIN — SODIUM CHLORIDE SCH MLS/HR: 900 INJECTION, SOLUTION INTRAVENOUS at 04:57

## 2018-03-08 RX ADMIN — KETOROLAC TROMETHAMINE SCH MG: 15 INJECTION INTRAMUSCULAR; INTRAVENOUS at 06:17

## 2018-03-08 RX ADMIN — ACETAMINOPHEN SCH MLS/HR: 10 INJECTION, SOLUTION INTRAVENOUS at 03:10

## 2018-03-08 NOTE — DISCHARGE INSTRUCTIONS
Discharge Instructions


Date of Service


Mar 8, 2018.





Admission


Reason for Admission:  Enteritis, Small Bowel Obstruction





Discharge


Discharge Diagnosis / Problem:  Abdominal pain





Discharge Goals


Goal(s):  Improve disease control





Activity Recommendations


Activity Limitations:  resume your previous activity


Lifting Limitations:  gradually increase as tolerated





.





Instructions / Follow-Up


Instructions / Follow-Up


Follow up with Dr. Licea and Dr. Alcantara in the next 1-2 weeks. Slowly 

increase your diet as tolerated, if you develop any pain or bloating with 

certain foods, make a note of them and try cutting them out or go slower on re-

introducing them into your diet.





If you develop any further fevers, chills, chest pain, shortness of breath, 

abdominal pain, blood in stool, or any other concerning symptoms, please seek 

medical attention.





Current Hospital Diet


Patient's current hospital diet: Regular Diet





Discharge Diet


Recommended Diet:  Regular Diet





Procedures


Procedures Performed:  


Colonoscopy 3/7/18 by Dr Licea - next Colonoscopy in 5 years





Pending Studies


Studies pending at discharge:  no





Medical Emergencies








.


Who to Call and When:





Medical Emergencies:  If at any time you feel your situation is an emergency, 

please call 911 immediately.





.





Non-Emergent Contact


Non-Emergency issues call your:  Primary Care Provider, Gastroenterologist





.


.








"Provider Documentation" section prepared by Carla Patel.








.

## 2018-03-08 NOTE — DISCHARGE SUMMARY
Discharge Summary


Date of Service


Mar 8, 2018.





Discharge Summary


Admission Date:


Mar 5, 2018 at 20:45


Discharge Date:  Mar 8, 2018


Discharge Disposition:  Home


Principal Diagnosis:  Abdominal pain


Immunizations:  


   Have You Had Influenza Vaccine:  Unknown


   History of Tetanus Vaccine?:  Unknown


   History of Pneumococcal:  Unknown


   History of Hepatitis B Vaccine:  Unknown


Procedures:


CT A/P on admission:


IMPRESSION: 





1. Findings consistent with partial mid to distal small bowel obstructive


change.


2. No evidence for a well-defined obstructing lesion, although there is a


significant decrease in caliber of the mid and distal small bowel 


At several locations suggesting nonspecific small bowel inflammatory change


versus enteritis.


3. No evidence for abscess or collection.


Consultations:


Gastroenterology





Medication Reconciliation


Continued Medications:  


Aspirin (Aspirin Ec) 81 Mg Tab


81 MG PO HS





Atorvastatin (Lipitor) 20 Mg Tab


20 MG PO HS





Cholecalciferol (Vitamin D) 2,000 Unit Cap


2000 INTER.UNIT PO QAM





Fiber Laxative (Fiber Laxative)  Ea


1 TAB PO QAM





Lansoprazole (Prevacid) 30 Mg Capcr


30 MG PO BID, CAP





Lorazepam (Lorazepam) 0.5 Mg Tab


0.5 MG PO DAILY PRN for Anxiety





Probiotic Product (Probiotic) 1 Cap Cap


1 CAP PO DAILY











Discharge Exam


Review of Systems:  


   Constitutional:  No fever, No chills, No sweats, No weight loss


   Eyes:  No worsening of vision


   ENT:  No hearing loss


   Respiratory:  No cough, No sputum, No wheezing, No shortness of breath, No 

dyspnea on exertion


   Cardiovascular:  No chest pain, No orthopnea


   Abdomen:  No pain, No nausea, No vomiting, No diarrhea, No constipation


   Musculoskeletal:  No joint pain, No muscle pain


   Genitourinary - Male:  No hematuria, No dysuria, No urinary frequency, No 

urinary urgency, No urinary hesitancy, No urinary retention


   Neurologic:  No memory loss, No paralysis, No weakness


   Endocrine:  No fatigue


   Integumentary:  No rash


Physical Exam:  


   General Appearance:  WD/WN, no apparent distress


   Eyes:  normal inspection, PERRL


   ENT:  hearing grossly normal


   Neck:  no adenopathy, no JVD


   Respiratory/Chest:  lungs clear, normal breath sounds, no respiratory 

distress


   Cardiovascular:  regular rate, rhythm, no murmur, normal peripheral pulses


   Abdomen / GI:  normal bowel sounds, non tender, soft


   Extremities:  no calf tenderness, no pedal edema


   Neurologic/Psychiatric:  alert, normal mood/affect, normal reflexes, 

oriented x 3


   Skin:  no rash





Hospital Course


HPI: Patient is a 57 year old male with IBS and Barrets Esophagus that presents 

with a 3 week history of worsening abdominal pain. The patient has been having 

intermittent abdominal pain every 3 days over the last few weeks that at can be 

anywhere between a 6-10/10, crampy to turning pain, changes location between 

the upper and lower quadrants, not associated with bowel movements, and does 

not resolve or worsen with any specific therapies. The patient come in to the 

hospital today because he began having fever, chills, and muscle aches this 

morning and was sent over by his PCP Dr. Alcantara over concerns of abdominal 

distention and a bowel obstruction. He has also been complaining of a recent 

cough with sputum production for the last few days with associated shortness of 

breath this morning. The patient did have several bowel movements today that 

started out as hard pellets and progressed to regular stools that were not 

loose or runny. He is currently complaining of 6/10 abdominal pain after having 

4mg of morphine in the ED. The patient also requests not having an NG tube at 

this time if it can be avoided due to severe gag reflex. The patient has 

followed with Dr. Licea in the past for multiple bowel obstructions that have 

been worked up to be secondary to acute inflammation from IBS. He gets 

colonoscopies q5 years and had his last one approximately 2 years ago. He has a 

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 

at [facility] on 03/20/18 for possible Botox injection into the Esophagus.








HOSPITAL COURSE;


58 yo M, had increased abdominal distention / discomfort, found to have 

diverticulosis on colonoscopy





Abdominal distention


- Unclear if this was true SBO - could have been false positive on CT scan due 

to no PO contrast


- Tolerated advanced diet


- Per Dr Licea, next colonoscopy in 5 years





Potential infectious enteritis


- Zosyn IV q8h given x 3 days, no other evidence of infection so will DC abx on 

dischareg


- Blood cultures negative x 48 hours





Hx of GERD w/Barretts Esophagus


- Continue IV Protonix


- Pt has follow up with [facility] on 20th





VTE: SCDs


Code status: Full


Dispo: DC'd home in good condition





Resident Physician Supervision Note:





I interviewed and examined the patient. Discussed with Dr. Patel and agree 

with findings and plan as documented in the note. Any exceptions or 

clarifications are listed here: None


Patient feels much better is tolerating regular diet he will be discharged to 

follow with outpatient GI medicine for possible capsule enteroscopy


Temperature 36 6 pulse 55 respiration 18 /90 he is in no distress at this 

time abdomen is soft patient be discharged home to resume his usual home 

medications and follow-up with GI med


Documented By:  Everette Hallman


Total Time Spent:  Greater than 30 minutes


This includes examination of the patient, discharge planning, medication 

reconciliation, and communication with other providers.





Discharge Instructions


Please refer to the electronic Patient Visit Report (Discharge Instructions) 

for additional information.





Follow-Up


- With PCP within a week


- With Dr Licea within 1-2 weeks





Additional Copies To


LEILA De La Rosa MD





Resident Tracking


Resident Involvement:  Resident Care Provided


Care Provided:  Adult Hospital Medicine

## 2018-03-28 ENCOUNTER — HOSPITAL ENCOUNTER (OUTPATIENT)
Dept: HOSPITAL 45 - C.NUCL | Age: 58
Discharge: HOME | End: 2018-03-28
Attending: NURSE PRACTITIONER
Payer: COMMERCIAL

## 2018-03-28 DIAGNOSIS — K20.9: ICD-10-CM

## 2018-03-28 DIAGNOSIS — M10.071: ICD-10-CM

## 2018-03-28 DIAGNOSIS — R14.0: ICD-10-CM

## 2018-03-28 DIAGNOSIS — Z48.89: Primary | ICD-10-CM

## 2018-03-28 LAB
ALBUMIN SERPL-MCNC: 4 GM/DL (ref 3.4–5)
ALP SERPL-CCNC: 85 U/L (ref 45–117)
ALT SERPL-CCNC: 45 U/L (ref 12–78)
AST SERPL-CCNC: 29 U/L (ref 15–37)
BUN SERPL-MCNC: 22 MG/DL (ref 7–18)
CALCIUM SERPL-MCNC: 9.2 MG/DL (ref 8.5–10.1)
CO2 SERPL-SCNC: 27 MMOL/L (ref 21–32)
CREAT SERPL-MCNC: 1.03 MG/DL (ref 0.6–1.4)
GLUCOSE SERPL-MCNC: 119 MG/DL (ref 70–99)
HBA1C MFR BLD: 5.9 % (ref 4.5–5.6)
KETONES UR QL STRIP: 97 MG/DL
PH UR: 188 MG/DL (ref 0–200)
POTASSIUM SERPL-SCNC: 4 MMOL/L (ref 3.5–5.1)
PROT SERPL-MCNC: 7.8 GM/DL (ref 6.4–8.2)
SODIUM SERPL-SCNC: 138 MMOL/L (ref 136–145)

## 2018-03-28 NOTE — DIAGNOSTIC IMAGING REPORT
GASTRIC EMPTYING



CLINICAL HISTORY: 57 years-old Male presenting with K20.9 [id number]

Bloated abdomen. 



TECHNIQUE: Following the oral administration of 1.161 mCi of technetium 99m

sulfur colloid in egg sandwich and 8 ounces of water, static abdominal images

are obtained anteriorly and posteriorly at 0 minutes, 1 hour, 2 hour, and 4 hour

time intervals. Gastric emptying was calculated utilizing the geometric mean

method.



COMPARISON: CT from 3/5/2018.



FINDINGS: 



There is approximately 19% activity remaining at the 1 hour time interval, 4%

remaining at the 2 hour time interval (normal is less than 60%), and less than

10%% activity remaining prior to the 4 hour time interval (normal is less than

10%).



IMPRESSION: 

Normal gastric emptying of solids.







Electronically signed by:  Paul Goyal M.D.

3/28/2018 10:31 AM



Dictated Date/Time:  3/28/2018 10:30 AM

## 2018-04-02 ENCOUNTER — HOSPITAL ENCOUNTER (OUTPATIENT)
Dept: HOSPITAL 45 - C.MTU | Age: 58
Discharge: HOME | End: 2018-04-02
Attending: INTERNAL MEDICINE
Payer: COMMERCIAL

## 2018-04-02 VITALS
HEIGHT: 72.99 IN | BODY MASS INDEX: 26.59 KG/M2 | WEIGHT: 200.62 LBS | HEIGHT: 72.99 IN | BODY MASS INDEX: 26.59 KG/M2 | WEIGHT: 200.62 LBS

## 2018-04-02 VITALS
OXYGEN SATURATION: 98 % | TEMPERATURE: 97.52 F | SYSTOLIC BLOOD PRESSURE: 152 MMHG | HEART RATE: 66 BPM | DIASTOLIC BLOOD PRESSURE: 87 MMHG

## 2018-04-02 VITALS
OXYGEN SATURATION: 98 % | HEART RATE: 90 BPM | SYSTOLIC BLOOD PRESSURE: 154 MMHG | TEMPERATURE: 98.06 F | DIASTOLIC BLOOD PRESSURE: 99 MMHG

## 2018-04-02 DIAGNOSIS — R14.0: Primary | ICD-10-CM

## 2018-04-02 DIAGNOSIS — K20.9: ICD-10-CM

## 2018-04-03 NOTE — PROCEDURE NOTE
Breath Hydrogen Test


Interpretation


Assessment:


Normal Lactulose breath test, without evidence of Small Intestinal Bacterial 

Overgrowth





Plan:


Return to Ordering physician for further evaluation and recommendations.

## 2018-04-05 ENCOUNTER — HOSPITAL ENCOUNTER (OUTPATIENT)
Dept: HOSPITAL 45 - C.GI | Age: 58
Discharge: HOME | End: 2018-04-05
Attending: INTERNAL MEDICINE
Payer: COMMERCIAL

## 2018-04-05 VITALS — DIASTOLIC BLOOD PRESSURE: 100 MMHG | SYSTOLIC BLOOD PRESSURE: 169 MMHG | HEART RATE: 66 BPM | OXYGEN SATURATION: 98 %

## 2018-04-05 VITALS
WEIGHT: 200.42 LBS | BODY MASS INDEX: 26.56 KG/M2 | HEIGHT: 72.99 IN | BODY MASS INDEX: 26.56 KG/M2 | WEIGHT: 200.42 LBS | HEIGHT: 72.99 IN

## 2018-04-05 VITALS — TEMPERATURE: 98.24 F

## 2018-04-05 DIAGNOSIS — E11.9: ICD-10-CM

## 2018-04-05 DIAGNOSIS — Z79.899: ICD-10-CM

## 2018-04-05 DIAGNOSIS — K20.9: Primary | ICD-10-CM

## 2018-04-05 DIAGNOSIS — K44.9: ICD-10-CM

## 2018-04-05 DIAGNOSIS — K22.8: ICD-10-CM

## 2018-04-05 DIAGNOSIS — Z79.82: ICD-10-CM

## 2018-04-05 DIAGNOSIS — Z80.0: ICD-10-CM

## 2018-04-05 DIAGNOSIS — E78.00: ICD-10-CM

## 2018-04-05 DIAGNOSIS — Z98.890: ICD-10-CM

## 2018-04-05 DIAGNOSIS — Z88.2: ICD-10-CM

## 2018-04-05 DIAGNOSIS — F41.9: ICD-10-CM

## 2018-04-05 DIAGNOSIS — Z90.89: ICD-10-CM

## 2018-04-05 DIAGNOSIS — Z87.891: ICD-10-CM

## 2018-04-05 DIAGNOSIS — M19.90: ICD-10-CM

## 2018-04-05 NOTE — ENDO HISTORY AND PHYSICAL
History & Physical


Date of Service:


Apr 5, 2018.


Chief Complaint:


ESOPHAGITIS


Referring Physician:


DR. QURESHI


History of Present Illness


58 yo CM who presents for EGD secondary to esophagitis.





Past Medical History


Diabetes, Arthritis, Fractures, Gastrointestinal Disorder, Anxiety, Reflux, 

High Cholesterol, Sleep Apnea, Kidney Disease





Past Surgical History


Hx Cardiac Surgery:  No


Hx Internal Defibrillator:  No


Hx Pacemaker:  No


Hx Abdominal Surgery:  Yes (APPY, HERNIA REPAIR)


Hx Post-Op Nausea and Vomiting:  No


Hx Cancer Surgery:  No


Hx Thoracic Surgery:  No


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 

FOOT SURGERY)


Hx Urinary Tract Surgery:  No





Family History


Colon CA, Polyp, IBD





Social History


Smoking Status:  Former Smoker


Hx Substance Use:  No


Hx Alcohol Use:  Yes (1-2 DRINKS DAILY)





Allergies


Coded Allergies:  


     Sulfa Antibiotics (Verified  Allergy, Intermediate, SEVERE HIVES/ITCHING, 4 /5/18)





Current Medications





Reported Home Medications








 Medications  Dose


 Route/Sig


 Max Daily Dose Days Date Category


 


 Prevacid


  (Lansoprazole) 30


 Mg Capcr  30 Mg


 PO BID


    3/5/18 Reported


 


 Lipitor


  (Atorvastatin


 Calcium) 20 Mg Tab  20 Mg


 PO HS


    1/24/17 Reported


 


 Fiber Laxative


  (Fiber)  Ea  1 Tab


 PO QAM


    12/8/16 Reported


 


 Vitamin D


  (Cholecalciferol)


 2,000 Unit Cap  2,000 Inter.unit


 PO QAM


    6/9/15 Reported


 


 Aspirin Ec


  (Aspirin) 81 Mg


 Tab  81 Mg


 PO HS


    6/9/15 Reported


 


 Lorazepam 0.5 Mg


 Tab  0.5 Mg


 PO DAILY PRN


    6/9/15 Reported











Vital Signs


Weight (Kilograms):  90.91


Height (Feet):  6


Height (Inches):  1











  Date Time  Temp Pulse Resp B/P (MAP) Pulse Ox O2 Delivery O2 Flow Rate FiO2


 


4/5/18 12:17 36.8 63 18 157/98 (117) 98 Room Air  











Physical Exam


General Appearance:  WD/WN, no apparent distress


Respiratory/Chest:  


   Auscultation:  breath sounds normal


Cardiovascular:  


   Heart Auscultation:  RRR


Abdomen:  


   Bowel Sounds:  normal


   Inspection & Palpation:  soft, non-distended, no tenderness, guarding & 

rebound





Assessment and Plan


Assessment:


58 yo CM who presents for EGD secondary to esophagitis.








Plan:


Proceed with EGD.

## 2018-04-05 NOTE — DISCHARGE INSTRUCTIONS
Endoscopy Patient Instructions


Date / Procedure(s) Performed


Apr 5, 2018.


EGD





Allergy Information


Coded Allergies:  


     Sulfa Antibiotics (Verified  Allergy, Intermediate, SEVERE HIVES/ITCHING, 4 /5/18)





Discharge Date / Findings


Apr 5, 2018.


Hiatal hernia


Distal esophageal biopsies





Medication Instructions


Stopped Medication(s):  


ASPIRIN 81MG PO LAST DOSE 04/05/18


OK to resume all medications today as prescribed





Reported Home Medications








 Medications  Dose


 Route/Sig


 Max Daily Dose Days Date Category


 


 Prevacid


  (Lansoprazole) 30


 Mg Capcr  30 Mg


 PO BID


    3/5/18 Reported


 


 Lipitor


  (Atorvastatin


 Calcium) 20 Mg Tab  20 Mg


 PO HS


    1/24/17 Reported


 


 Fiber Laxative


  (Fiber)  Ea  1 Tab


 PO QAM


    12/8/16 Reported


 


 Vitamin D


  (Cholecalciferol)


 2,000 Unit Cap  2,000 Inter.unit


 PO QAM


    6/9/15 Reported


 


 Aspirin Ec


  (Aspirin) 81 Mg


 Tab  81 Mg


 PO HS


    6/9/15 Reported


 


 Lorazepam 0.5 Mg


 Tab  0.5 Mg


 PO DAILY PRN


    6/9/15 Reported











Provider Instructions





Activity Restrictions





-  No exercising or heavy lifting for 24 hours. 


-  Do not drink alcohol the day of the procedure.


-  Do not drive a car or operate machinery until the day after the procedure.


-  Do not make any important decisions or sign important papers in 24 hours 

after the procedure.





Following Day:





-  Return to full activity which may include returning to work/school.





Diet





Start your diet with liquids and light foods (jello, soup, juice, toast).  Then 

eat your usual diet if not nauseated.





Treatment For Common After Affects





For mild abdominal pain, bloating, or excessive gas:





-  Rest


-  Eat lightly


-  Lie on right side





Follow-Up Information


Follow-up with DR. QURESHI as scheduled





Anesthesia Information





What You Should Know





You have had a procedure that required some medicine to reduce anxiety and 

discomfort. This treatment is called moderate sedation.  


After receiving the treatment, you may be sleepy, but you will be able to 

breathe on your own.  The effects of the treatment may last for several hours.








Follow these instructions along with Activity/Diet recommendations noted above:





*  Do NOT do anything where dizziness or clumsiness would be dangerous.





*  Rest quietly at home today, then you can be up and about tomorrow.





*  Have a responsible person stay with you the rest of today.





*  You may have had an I.V. today.  If so, you may take the dressing off later 

today.





Recommendations


 


Call your doctor if:





*  Trouble breathing 





*  Continuous vomiting for more than 24 hours








*  Temperature above 101 degrees





*  Severe abdominal pain or bloating





*  Pain not relieved by pain medicine ordered





*  There is increased drainage or redness from any incision





*  A large amount of rectal bleeding greater than 2-3 tablespoons. 


   (If you had a polyp/s removed or have hemorrhoids, a small amount of blood -


    from the rectum is to be expected.)





*  You have any unanswered questions or concerns.








IN THE EVENT OF A SERIOUS EMERGENCY, GO TO THE NEAREST EMERGENCY ROOM








       Your discharge instructions were prepared by provider Kayden Licea.





 Patient Instructions Signature Page














Bobby Cummings 











Patient (or Guardian) Signature/Date:____________________________________ I 

have read and understand the instructions given to me by my caregivers.








Caregiver/RN/Doctor Signature/Date:____________________________________











The above-named patient and/or guardian has received patient instructions on 

this date.





























+  Original Patient Signature Page (only) stays with chart.  Please make copy 

for patient.

## 2018-04-05 NOTE — ANESTHESIOLOGY PROGRESS NOTE
Anesthesia Post Op Note


Date & Time


Apr 5, 2018 at 13:52





Vital Signs


Pain Intensity:  0





Vital Signs Past 12 Hours








  Date Time  Temp Pulse Resp B/P (MAP) Pulse Ox O2 Delivery O2 Flow Rate FiO2


 


4/5/18 13:37  72 18 190/92 (124) 95 Room Air  


 


4/5/18 13:25  77 18 157/95 (115) 93 Room Air  


 


4/5/18 12:17 36.8 63 18 157/98 (117) 98 Room Air  











Notes


Mental Status:  alert / awake / arousable, participated in evaluation


Pt Amnestic to Procedure:  Yes


Nausea / Vomiting:  adequately controlled


Pain:  adequately controlled


Airway Patency, RR, SpO2:  stable & adequate


BP & HR:  stable & adequate


Hydration State:  stable & adequate


Anesthetic Complications:  no major complications apparent

## 2018-04-05 NOTE — GI REPORT
Procedure Date: 4/5/2018 12:58 PM

Procedure:            Upper GI endoscopy

Indications:          Suspected esophagitis

Medicines:            Monitored Anesthesia Care

Complications:        No immediate complications.

Estimated Blood Loss: Estimated blood loss: none.

Procedure:            Pre-Anesthesia Assessment:

                      - Prior to the procedure, a History and Physical was 

                      performed, and patient medications and allergies were 

                      reviewed. The patient's tolerance of previous 

                      anesthesia was also reviewed. The risks and benefits of 

                      the procedure and the sedation options and risks were 

                      discussed with the patient. All questions were 

                      answered, and informed consent was obtained. Prior 

                      Anticoagulants: The patient has taken aspirin, last 

                      dose was 2 days prior to procedure. ASA Grade 

                      Assessment: II - A patient with mild systemic disease. 

                      After reviewing the risks and benefits, the patient was 

                      deemed in satisfactory condition to undergo the 

                      procedure.

                      After obtaining informed consent, the endoscope was 

                      passed under direct vision. Throughout the procedure, 

                      the patient's blood pressure, pulse, and oxygen 

                      saturations were monitored continuously. The scope was 

                      introduced through the mouth, and advanced to the 

                      second part of duodenum. The upper GI endoscopy was 

                      accomplished without difficulty. The patient tolerated 

                      the procedure well.

Findings:

     The Z-line was irregular. Biopsies were taken with a cold forceps for 

     histology.

     A medium-sized hiatal hernia was present.

     The examined duodenum was normal.

Impression:           - Z-line irregular. Biopsied.

                      - Medium-sized hiatal hernia.

                      - Normal examined duodenum.

Recommendation:       - Resume previous diet.

                      - Continue present medications.

                      - Await pathology results.

                      - Return to primary care physician as previously 

                      scheduled.

Kayden Licea, 

4/5/2018 1:30:21 PM

This report has been signed electronically.

Note Initiated On: 4/5/2018 12:58 PM

     I attest to the content of the Intraoperative Record and orders 

     documented therein, exceptions below

## 2018-08-16 ENCOUNTER — HOSPITAL ENCOUNTER (OUTPATIENT)
Dept: HOSPITAL 45 - C.LAB1850 | Age: 58
Discharge: HOME | End: 2018-08-16
Attending: PHYSICIAN ASSISTANT
Payer: COMMERCIAL

## 2018-08-16 DIAGNOSIS — M25.552: Primary | ICD-10-CM

## 2018-08-16 NOTE — DIAGNOSTIC IMAGING REPORT
L HIP UNILATERAL 2 VIEWS



CLINICAL HISTORY: 57 years-old Male presenting with M25.552 Acute hip pain,

left, pain for a few weeks. 



TECHNIQUE: Frontal and frog-leg lateral views of the left hip were obtained.  



COMPARISON: Comparison made to CT of abdomen pelvis from 3/5/2018.



FINDINGS:

Vasectomy clips noted. Left hip joint congruent. No joint space loss. No

significant osteophytosis. Left femoral neck intact. Visualized portion of the

bony pelvis intact. No acute fracture or malalignment. Mild degenerative changes

of the lower lumbar spine suggested. 



IMPRESSION:

No acute osseous injury or advanced degenerative change.







Electronically signed by:  Paul Goyal M.D.

8/16/2018 8:46 AM



Dictated Date/Time:  8/16/2018 8:42 AM